# Patient Record
Sex: MALE | Race: BLACK OR AFRICAN AMERICAN | URBAN - METROPOLITAN AREA
[De-identification: names, ages, dates, MRNs, and addresses within clinical notes are randomized per-mention and may not be internally consistent; named-entity substitution may affect disease eponyms.]

---

## 2020-01-15 ENCOUNTER — EMERGENCY (EMERGENCY)
Facility: HOSPITAL | Age: 40
LOS: 1 days | Discharge: HOME | End: 2020-01-15
Attending: EMERGENCY MEDICINE
Payer: COMMERCIAL

## 2020-01-15 VITALS
SYSTOLIC BLOOD PRESSURE: 105 MMHG | HEIGHT: 74 IN | DIASTOLIC BLOOD PRESSURE: 72 MMHG | RESPIRATION RATE: 16 BRPM | OXYGEN SATURATION: 96 % | TEMPERATURE: 98 F | WEIGHT: 300.05 LBS | HEART RATE: 91 BPM

## 2020-01-15 VITALS
SYSTOLIC BLOOD PRESSURE: 132 MMHG | DIASTOLIC BLOOD PRESSURE: 63 MMHG | HEART RATE: 84 BPM | OXYGEN SATURATION: 97 % | RESPIRATION RATE: 20 BRPM | TEMPERATURE: 98 F

## 2020-01-15 LAB
ALBUMIN SERPL ELPH-MCNC: 4.3 G/DL — SIGNIFICANT CHANGE UP (ref 3.3–5.2)
ALP SERPL-CCNC: 51 U/L — SIGNIFICANT CHANGE UP (ref 40–120)
ALT FLD-CCNC: 52 U/L — HIGH
ANION GAP SERPL CALC-SCNC: 13 MMOL/L — SIGNIFICANT CHANGE UP (ref 5–17)
AST SERPL-CCNC: 41 U/L — HIGH
BASOPHILS # BLD AUTO: 0.04 K/UL — SIGNIFICANT CHANGE UP (ref 0–0.2)
BASOPHILS NFR BLD AUTO: 0.9 % — SIGNIFICANT CHANGE UP (ref 0–2)
BILIRUB SERPL-MCNC: 0.3 MG/DL — LOW (ref 0.4–2)
BUN SERPL-MCNC: 15 MG/DL — SIGNIFICANT CHANGE UP (ref 8–20)
CALCIUM SERPL-MCNC: 9.3 MG/DL — SIGNIFICANT CHANGE UP (ref 8.6–10.2)
CHLORIDE SERPL-SCNC: 98 MMOL/L — SIGNIFICANT CHANGE UP (ref 98–107)
CO2 SERPL-SCNC: 28 MMOL/L — SIGNIFICANT CHANGE UP (ref 22–29)
CREAT SERPL-MCNC: 1.29 MG/DL — SIGNIFICANT CHANGE UP (ref 0.5–1.3)
EOSINOPHIL # BLD AUTO: 0 K/UL — SIGNIFICANT CHANGE UP (ref 0–0.5)
EOSINOPHIL NFR BLD AUTO: 0 % — SIGNIFICANT CHANGE UP (ref 0–6)
GIANT PLATELETS BLD QL SMEAR: PRESENT — SIGNIFICANT CHANGE UP
GLUCOSE SERPL-MCNC: 127 MG/DL — HIGH (ref 70–115)
HCT VFR BLD CALC: 44.5 % — SIGNIFICANT CHANGE UP (ref 39–50)
HGB BLD-MCNC: 14.9 G/DL — SIGNIFICANT CHANGE UP (ref 13–17)
LYMPHOCYTES # BLD AUTO: 0.84 K/UL — LOW (ref 1–3.3)
LYMPHOCYTES # BLD AUTO: 20.4 % — SIGNIFICANT CHANGE UP (ref 13–44)
MANUAL SMEAR VERIFICATION: SIGNIFICANT CHANGE UP
MCHC RBC-ENTMCNC: 27.9 PG — SIGNIFICANT CHANGE UP (ref 27–34)
MCHC RBC-ENTMCNC: 33.5 GM/DL — SIGNIFICANT CHANGE UP (ref 32–36)
MCV RBC AUTO: 83.2 FL — SIGNIFICANT CHANGE UP (ref 80–100)
MONOCYTES # BLD AUTO: 0.62 K/UL — SIGNIFICANT CHANGE UP (ref 0–0.9)
MONOCYTES NFR BLD AUTO: 15 % — HIGH (ref 2–14)
NEUTROPHILS # BLD AUTO: 2.6 K/UL — SIGNIFICANT CHANGE UP (ref 1.8–7.4)
NEUTROPHILS NFR BLD AUTO: 61.9 % — SIGNIFICANT CHANGE UP (ref 43–77)
NEUTS BAND # BLD: 0.9 % — SIGNIFICANT CHANGE UP (ref 0–8)
PLAT MORPH BLD: NORMAL — SIGNIFICANT CHANGE UP
PLATELET # BLD AUTO: 177 K/UL — SIGNIFICANT CHANGE UP (ref 150–400)
POTASSIUM SERPL-MCNC: 3.7 MMOL/L — SIGNIFICANT CHANGE UP (ref 3.5–5.3)
POTASSIUM SERPL-SCNC: 3.7 MMOL/L — SIGNIFICANT CHANGE UP (ref 3.5–5.3)
PROT SERPL-MCNC: 7.1 G/DL — SIGNIFICANT CHANGE UP (ref 6.6–8.7)
RBC # BLD: 5.35 M/UL — SIGNIFICANT CHANGE UP (ref 4.2–5.8)
RBC # FLD: 12.9 % — SIGNIFICANT CHANGE UP (ref 10.3–14.5)
RBC BLD AUTO: NORMAL — SIGNIFICANT CHANGE UP
SODIUM SERPL-SCNC: 139 MMOL/L — SIGNIFICANT CHANGE UP (ref 135–145)
VARIANT LYMPHS # BLD: 0.9 % — SIGNIFICANT CHANGE UP (ref 0–6)
WBC # BLD: 4.14 K/UL — SIGNIFICANT CHANGE UP (ref 3.8–10.5)
WBC # FLD AUTO: 4.14 K/UL — SIGNIFICANT CHANGE UP (ref 3.8–10.5)

## 2020-01-15 PROCEDURE — 96374 THER/PROPH/DIAG INJ IV PUSH: CPT

## 2020-01-15 PROCEDURE — 99284 EMERGENCY DEPT VISIT MOD MDM: CPT | Mod: 25

## 2020-01-15 PROCEDURE — 36415 COLL VENOUS BLD VENIPUNCTURE: CPT

## 2020-01-15 PROCEDURE — 71045 X-RAY EXAM CHEST 1 VIEW: CPT | Mod: 26

## 2020-01-15 PROCEDURE — 80053 COMPREHEN METABOLIC PANEL: CPT

## 2020-01-15 PROCEDURE — 85027 COMPLETE CBC AUTOMATED: CPT

## 2020-01-15 PROCEDURE — 99285 EMERGENCY DEPT VISIT HI MDM: CPT

## 2020-01-15 PROCEDURE — 93010 ELECTROCARDIOGRAM REPORT: CPT

## 2020-01-15 PROCEDURE — 96375 TX/PRO/DX INJ NEW DRUG ADDON: CPT

## 2020-01-15 PROCEDURE — 71045 X-RAY EXAM CHEST 1 VIEW: CPT

## 2020-01-15 PROCEDURE — 93005 ELECTROCARDIOGRAM TRACING: CPT

## 2020-01-15 PROCEDURE — 94640 AIRWAY INHALATION TREATMENT: CPT

## 2020-01-15 RX ORDER — IPRATROPIUM/ALBUTEROL SULFATE 18-103MCG
3 AEROSOL WITH ADAPTER (GRAM) INHALATION ONCE
Refills: 0 | Status: COMPLETED | OUTPATIENT
Start: 2020-01-15 | End: 2020-01-15

## 2020-01-15 RX ORDER — KETOROLAC TROMETHAMINE 30 MG/ML
15 SYRINGE (ML) INJECTION ONCE
Refills: 0 | Status: DISCONTINUED | OUTPATIENT
Start: 2020-01-15 | End: 2020-01-15

## 2020-01-15 RX ORDER — SODIUM CHLORIDE 9 MG/ML
1000 INJECTION INTRAMUSCULAR; INTRAVENOUS; SUBCUTANEOUS ONCE
Refills: 0 | Status: COMPLETED | OUTPATIENT
Start: 2020-01-15 | End: 2020-01-15

## 2020-01-15 RX ORDER — ONDANSETRON 8 MG/1
4 TABLET, FILM COATED ORAL ONCE
Refills: 0 | Status: COMPLETED | OUTPATIENT
Start: 2020-01-15 | End: 2020-01-15

## 2020-01-15 RX ADMIN — Medication 3 MILLILITER(S): at 22:26

## 2020-01-15 RX ADMIN — ONDANSETRON 4 MILLIGRAM(S): 8 TABLET, FILM COATED ORAL at 22:24

## 2020-01-15 RX ADMIN — Medication 15 MILLIGRAM(S): at 22:25

## 2020-01-15 RX ADMIN — SODIUM CHLORIDE 1000 MILLILITER(S): 9 INJECTION INTRAMUSCULAR; INTRAVENOUS; SUBCUTANEOUS at 22:24

## 2020-01-15 NOTE — ED PROVIDER NOTE - PHYSICAL EXAMINATION
Gen: awake, non toxic, weak appearing  HEENT: slightly dry mucus membranes, pink conjunctivae, EOMI. no photophobia  Neck: supple. full rom  CV: RRR, nl s1/s2.  Resp: CTAB, normal rate and effort  GI: Abdomen soft, NT, ND. No rebound, no guarding  : No CVAT  Neuro: A&O x 3, moving all 4 extremities  MSK: No spine or joint tenderness to palpation  Skin: No rashes. intact and perfused.

## 2020-01-15 NOTE — ED PROVIDER NOTE - CLINICAL SUMMARY MEDICAL DECISION MAKING FREE TEXT BOX
pt with likely flu/other viral infection given constellation of symptoms. no meningeal symptoms, normal mental status currently, abd benign, cxr clear. felt improved with symptomatic treatment. has script for supportive care meds and tamiflu. several days of symptoms, return precautions stable for d/c.

## 2020-01-15 NOTE — ED PROVIDER NOTE - OBJECTIVE STATEMENT
38 y/o male no pmh c/o 2-3 days of myalgias, cough, wheezing, nausea with one episode vomiting, abd cramping, epistaxis and generalized weakness/fatigue.  WEnt to urgent care, had neg flu swab, given script for tamiflu and supportive care meds. Did not take any antipyretics today. No photophobia, neck pain/stiffness, significant headache. 38 y/o male no pmh c/o 2-3 days of myalgias, cough, wheezing, nausea with one episode vomiting, abd cramping, epistaxis and generalized weakness/fatigue.  WEnt to urgent care, had neg flu swab, given script for tamiflu and supportive care meds. Did not take any antipyretics today. No photophobia, neck pain/stiffness, significant headache.    ROS: + fever/chills. No eye pain/changes in vision, No ear pain/sore throat/dysphagia, No chest pain/palpitations. No SOB , no black/bloody bm. No dysuria/frequency/discharge, No headache. No Dizziness.    No rashes or breaks in skin. No numbness/tingling/weakness.

## 2020-01-15 NOTE — ED ADULT NURSE NOTE - OBJECTIVE STATEMENT
patient c/o nearly a week  of generalized body aches, weakness, cough, fever. states he went to urgent care and was flu negative but placed on tamiflu and symptoms persisted. per family he has not been eating/drinking as much as usual the last few days,

## 2020-01-15 NOTE — ED PROVIDER NOTE - PATIENT PORTAL LINK FT
You can access the FollowMyHealth Patient Portal offered by [facility] by registering at the following website: [email or web address]. By joining [employer]’s FollowMyHealth portal, you will also be able to view your health information using other applications (apps) compatible with our system.

## 2020-01-15 NOTE — ED PROVIDER NOTE - NSFOLLOWUPINSTRUCTIONS_ED_ALL_ED_FT
Viral Respiratory Infection    A viral respiratory infection is an illness that affects parts of the body used for breathing, like the lungs, nose, and throat. It is caused by a germ called a virus. Symptoms can include runny nose, coughing, sneezing, fatigue, body aches, sore throat, fever, or headache. Over the counter medicine can be used to manage the symptoms but the infection typically goes away on its own in 5 to 10 days.     SEEK IMMEDIATE MEDICAL CARE IF YOU HAVE ANY OF THE FOLLOWING SYMPTOMS: shortness of breath, chest pain, fever over 10 days, or lightheadedness/dizziness.     Fever    A fever is an increase in the body's temperature above 100.4°F (38°C) or higher. In adults and children older than three months, a brief mild or moderate fever generally has no long-term effect, and it usually does not require treatment. Many times, fevers are the result of viral infections, which are self-resolving.  However, certain symptoms or diagnostic tests may suggest a bacterial infection that may respond to antibiotics. Take medications as directed by your health care provider.    SEEK IMMEDIATE MEDICAL CARE IF YOU OR YOUR CHILD HAVE ANY OF THE FOLLOWING SYMPTOMS : shortness of breath, seizure, rash/stiff neck/headache, severe abdominal pain, persistent vomiting, any signs of dehydration, or if your child has a fever for over five (5) days.

## 2021-04-19 NOTE — ED ADULT NURSE NOTE - CAS TRG GENERAL NORM CIRC DET
Willis catheter 16 Divehi inserted using sterile technique.  Pt noted to have 200 mL urine output at this time. Strong peripheral pulses

## 2022-07-01 ENCOUNTER — OFFICE (OUTPATIENT)
Dept: URBAN - METROPOLITAN AREA CLINIC 115 | Facility: CLINIC | Age: 42
Setting detail: OPHTHALMOLOGY
End: 2022-07-01
Payer: COMMERCIAL

## 2022-07-01 DIAGNOSIS — B30.9: ICD-10-CM

## 2022-07-01 PROCEDURE — 92002 INTRM OPH EXAM NEW PATIENT: CPT | Performed by: OPTOMETRIST

## 2022-07-01 ASSESSMENT — TONOMETRY: OD_IOP_MMHG: 20

## 2022-07-01 ASSESSMENT — SPHEQUIV_DERIVED
OD_SPHEQUIV: -0.625
OS_SPHEQUIV: -0.125

## 2022-07-01 ASSESSMENT — REFRACTION_AUTOREFRACTION
OD_SPHERE: -0.75
OS_AXIS: 091
OD_CYLINDER: +0.25
OS_CYLINDER: -1.25
OS_SPHERE: +0.50
OD_AXIS: 138

## 2022-07-01 ASSESSMENT — VISUAL ACUITY
OD_BCVA: 20/20-1
OS_BCVA: 20/20

## 2022-11-27 ENCOUNTER — INPATIENT (INPATIENT)
Facility: HOSPITAL | Age: 42
LOS: 1 days | Discharge: HOME | DRG: 313 | End: 2022-11-29
Attending: FAMILY MEDICINE | Admitting: STUDENT IN AN ORGANIZED HEALTH CARE EDUCATION/TRAINING PROGRAM
Payer: COMMERCIAL

## 2022-11-27 VITALS
SYSTOLIC BLOOD PRESSURE: 179 MMHG | TEMPERATURE: 98 F | DIASTOLIC BLOOD PRESSURE: 105 MMHG | HEART RATE: 114 BPM | RESPIRATION RATE: 20 BRPM | OXYGEN SATURATION: 100 %

## 2022-11-27 DIAGNOSIS — R07.9 CHEST PAIN, UNSPECIFIED: ICD-10-CM

## 2022-11-27 LAB
ALBUMIN SERPL ELPH-MCNC: 3.6 G/DL — SIGNIFICANT CHANGE UP (ref 3.3–5)
ALP SERPL-CCNC: 68 U/L — SIGNIFICANT CHANGE UP (ref 40–120)
ALT FLD-CCNC: 122 U/L — HIGH (ref 12–78)
ANION GAP SERPL CALC-SCNC: 3 MMOL/L — LOW (ref 5–17)
AST SERPL-CCNC: 71 U/L — HIGH (ref 15–37)
BASOPHILS # BLD AUTO: 0.01 K/UL — SIGNIFICANT CHANGE UP (ref 0–0.2)
BASOPHILS # BLD AUTO: 0.05 K/UL — SIGNIFICANT CHANGE UP (ref 0–0.2)
BASOPHILS NFR BLD AUTO: 0.1 % — SIGNIFICANT CHANGE UP (ref 0–2)
BASOPHILS NFR BLD AUTO: 0.8 % — SIGNIFICANT CHANGE UP (ref 0–2)
BILIRUB SERPL-MCNC: 0.2 MG/DL — SIGNIFICANT CHANGE UP (ref 0.2–1.2)
BUN SERPL-MCNC: 10 MG/DL — SIGNIFICANT CHANGE UP (ref 7–23)
CALCIUM SERPL-MCNC: 8.5 MG/DL — SIGNIFICANT CHANGE UP (ref 8.5–10.1)
CHLORIDE SERPL-SCNC: 108 MMOL/L — SIGNIFICANT CHANGE UP (ref 96–108)
CO2 SERPL-SCNC: 29 MMOL/L — SIGNIFICANT CHANGE UP (ref 22–31)
CREAT SERPL-MCNC: 1.07 MG/DL — SIGNIFICANT CHANGE UP (ref 0.5–1.3)
D DIMER BLD IA.RAPID-MCNC: 257 NG/ML DDU — HIGH
D DIMER BLD IA.RAPID-MCNC: 3391 NG/ML DDU — HIGH
EGFR: 89 ML/MIN/1.73M2 — SIGNIFICANT CHANGE UP
EOSINOPHIL # BLD AUTO: 0.15 K/UL — SIGNIFICANT CHANGE UP (ref 0–0.5)
EOSINOPHIL # BLD AUTO: 0.25 K/UL — SIGNIFICANT CHANGE UP (ref 0–0.5)
EOSINOPHIL NFR BLD AUTO: 2.2 % — SIGNIFICANT CHANGE UP (ref 0–6)
EOSINOPHIL NFR BLD AUTO: 4 % — SIGNIFICANT CHANGE UP (ref 0–6)
FLUAV AG NPH QL: SIGNIFICANT CHANGE UP
FLUBV AG NPH QL: SIGNIFICANT CHANGE UP
GLUCOSE SERPL-MCNC: 136 MG/DL — HIGH (ref 70–99)
HCT VFR BLD CALC: 41.4 % — SIGNIFICANT CHANGE UP (ref 39–50)
HCT VFR BLD CALC: 41.6 % — SIGNIFICANT CHANGE UP (ref 39–50)
HGB BLD-MCNC: 12.2 G/DL — LOW (ref 13–17)
HGB BLD-MCNC: 14 G/DL — SIGNIFICANT CHANGE UP (ref 13–17)
IMM GRANULOCYTES NFR BLD AUTO: 0.2 % — SIGNIFICANT CHANGE UP (ref 0–0.9)
IMM GRANULOCYTES NFR BLD AUTO: 0.6 % — SIGNIFICANT CHANGE UP (ref 0–0.9)
LIDOCAIN IGE QN: 115 U/L — SIGNIFICANT CHANGE UP (ref 73–393)
LYMPHOCYTES # BLD AUTO: 1.02 K/UL — SIGNIFICANT CHANGE UP (ref 1–3.3)
LYMPHOCYTES # BLD AUTO: 16.5 % — SIGNIFICANT CHANGE UP (ref 13–44)
LYMPHOCYTES # BLD AUTO: 3.06 K/UL — SIGNIFICANT CHANGE UP (ref 1–3.3)
LYMPHOCYTES # BLD AUTO: 43.9 % — SIGNIFICANT CHANGE UP (ref 13–44)
MAGNESIUM SERPL-MCNC: 1.8 MG/DL — SIGNIFICANT CHANGE UP (ref 1.6–2.6)
MCHC RBC-ENTMCNC: 27.2 PG — SIGNIFICANT CHANGE UP (ref 27–34)
MCHC RBC-ENTMCNC: 28.2 PG — SIGNIFICANT CHANGE UP (ref 27–34)
MCHC RBC-ENTMCNC: 29.3 GM/DL — LOW (ref 32–36)
MCHC RBC-ENTMCNC: 33.8 GM/DL — SIGNIFICANT CHANGE UP (ref 32–36)
MCV RBC AUTO: 83.3 FL — SIGNIFICANT CHANGE UP (ref 80–100)
MCV RBC AUTO: 92.7 FL — SIGNIFICANT CHANGE UP (ref 80–100)
MONOCYTES # BLD AUTO: 0.61 K/UL — SIGNIFICANT CHANGE UP (ref 0–0.9)
MONOCYTES # BLD AUTO: 0.61 K/UL — SIGNIFICANT CHANGE UP (ref 0–0.9)
MONOCYTES NFR BLD AUTO: 8.8 % — SIGNIFICANT CHANGE UP (ref 2–14)
MONOCYTES NFR BLD AUTO: 9.8 % — SIGNIFICANT CHANGE UP (ref 2–14)
NEUTROPHILS # BLD AUTO: 3.1 K/UL — SIGNIFICANT CHANGE UP (ref 1.8–7.4)
NEUTROPHILS # BLD AUTO: 4.26 K/UL — SIGNIFICANT CHANGE UP (ref 1.8–7.4)
NEUTROPHILS NFR BLD AUTO: 44.4 % — SIGNIFICANT CHANGE UP (ref 43–77)
NEUTROPHILS NFR BLD AUTO: 68.7 % — SIGNIFICANT CHANGE UP (ref 43–77)
NT-PROBNP SERPL-SCNC: 98 PG/ML — SIGNIFICANT CHANGE UP (ref 0–125)
PLATELET # BLD AUTO: 208 K/UL — SIGNIFICANT CHANGE UP (ref 150–400)
PLATELET # BLD AUTO: SIGNIFICANT CHANGE UP (ref 150–400)
POTASSIUM SERPL-MCNC: 3.8 MMOL/L — SIGNIFICANT CHANGE UP (ref 3.5–5.3)
POTASSIUM SERPL-SCNC: 3.8 MMOL/L — SIGNIFICANT CHANGE UP (ref 3.5–5.3)
PROT SERPL-MCNC: 6.9 GM/DL — SIGNIFICANT CHANGE UP (ref 6–8.3)
RBC # BLD: 4.49 M/UL — SIGNIFICANT CHANGE UP (ref 4.2–5.8)
RBC # BLD: 4.97 M/UL — SIGNIFICANT CHANGE UP (ref 4.2–5.8)
RBC # FLD: 13.3 % — SIGNIFICANT CHANGE UP (ref 10.3–14.5)
RBC # FLD: 16.7 % — HIGH (ref 10.3–14.5)
RSV RNA NPH QL NAA+NON-PROBE: SIGNIFICANT CHANGE UP
SARS-COV-2 RNA SPEC QL NAA+PROBE: SIGNIFICANT CHANGE UP
SODIUM SERPL-SCNC: 140 MMOL/L — SIGNIFICANT CHANGE UP (ref 135–145)
TROPONIN I, HIGH SENSITIVITY RESULT: 10.91 NG/L — SIGNIFICANT CHANGE UP
WBC # BLD: 6.2 K/UL — SIGNIFICANT CHANGE UP (ref 3.8–10.5)
WBC # BLD: 6.97 K/UL — SIGNIFICANT CHANGE UP (ref 3.8–10.5)
WBC # FLD AUTO: 6.2 K/UL — SIGNIFICANT CHANGE UP (ref 3.8–10.5)
WBC # FLD AUTO: 6.97 K/UL — SIGNIFICANT CHANGE UP (ref 3.8–10.5)

## 2022-11-27 PROCEDURE — 76700 US EXAM ABDOM COMPLETE: CPT

## 2022-11-27 PROCEDURE — 85025 COMPLETE CBC W/AUTO DIFF WBC: CPT

## 2022-11-27 PROCEDURE — 93017 CV STRESS TEST TRACING ONLY: CPT

## 2022-11-27 PROCEDURE — 83036 HEMOGLOBIN GLYCOSYLATED A1C: CPT

## 2022-11-27 PROCEDURE — 80053 COMPREHEN METABOLIC PANEL: CPT

## 2022-11-27 PROCEDURE — 82962 GLUCOSE BLOOD TEST: CPT

## 2022-11-27 PROCEDURE — 80061 LIPID PANEL: CPT

## 2022-11-27 PROCEDURE — 93010 ELECTROCARDIOGRAM REPORT: CPT

## 2022-11-27 PROCEDURE — 78452 HT MUSCLE IMAGE SPECT MULT: CPT

## 2022-11-27 PROCEDURE — 93306 TTE W/DOPPLER COMPLETE: CPT

## 2022-11-27 PROCEDURE — 71045 X-RAY EXAM CHEST 1 VIEW: CPT | Mod: 26

## 2022-11-27 PROCEDURE — 71275 CT ANGIOGRAPHY CHEST: CPT | Mod: 26,MA

## 2022-11-27 PROCEDURE — 85027 COMPLETE CBC AUTOMATED: CPT

## 2022-11-27 PROCEDURE — 84484 ASSAY OF TROPONIN QUANT: CPT

## 2022-11-27 PROCEDURE — 36415 COLL VENOUS BLD VENIPUNCTURE: CPT

## 2022-11-27 PROCEDURE — 99285 EMERGENCY DEPT VISIT HI MDM: CPT

## 2022-11-27 PROCEDURE — A9500: CPT

## 2022-11-27 PROCEDURE — 80074 ACUTE HEPATITIS PANEL: CPT

## 2022-11-27 PROCEDURE — 93005 ELECTROCARDIOGRAM TRACING: CPT

## 2022-11-27 PROCEDURE — G0378: CPT

## 2022-11-27 RX ORDER — ASPIRIN/CALCIUM CARB/MAGNESIUM 324 MG
325 TABLET ORAL ONCE
Refills: 0 | Status: COMPLETED | OUTPATIENT
Start: 2022-11-27 | End: 2022-11-27

## 2022-11-27 RX ORDER — NITROGLYCERIN 6.5 MG
0.4 CAPSULE, EXTENDED RELEASE ORAL
Refills: 0 | Status: DISCONTINUED | OUTPATIENT
Start: 2022-11-27 | End: 2022-11-29

## 2022-11-27 RX ORDER — MORPHINE SULFATE 50 MG/1
4 CAPSULE, EXTENDED RELEASE ORAL ONCE
Refills: 0 | Status: DISCONTINUED | OUTPATIENT
Start: 2022-11-27 | End: 2022-11-27

## 2022-11-27 RX ADMIN — Medication 0.4 MILLIGRAM(S): at 20:07

## 2022-11-27 RX ADMIN — Medication 0.4 MILLIGRAM(S): at 20:21

## 2022-11-27 RX ADMIN — Medication 325 MILLIGRAM(S): at 20:06

## 2022-11-27 NOTE — ED ADULT NURSE NOTE - OBJECTIVE STATEMENT
patient complaining of sternal chest pain with sob which has been going for the past 2 weeks, worsened today. patient reports that talking aggravates  his pain, denies nausea, vomiting, fever

## 2022-11-27 NOTE — ED ADULT NURSE REASSESSMENT NOTE - NS ED NURSE REASSESS COMMENT FT1
PA at bedside
patient reports chest pressure improvement after second nitroglycerin, patient rates pain at 3/10 and declines any more nitro.

## 2022-11-27 NOTE — ED PROVIDER NOTE - PHYSICAL EXAMINATION
Constitutional: NAD AAOx3  Eyes: PERRL, EOMI  Head: Normocephalic atraumatic  Mouth: MMM  Cardiac: tachycardic   Resp: Lungs CTAB  GI: Abd s/nt/nd  Neuro: CN2-12 intact  Extremities: Intact distal pulses b/l, no calf tenderness, normal ROM b/l UE and LE   Skin: No rashes

## 2022-11-27 NOTE — ED PROVIDER NOTE - OBJECTIVE STATEMENT
43 y/o male presents to the ED for intermittent chest pressure x 2weeks. Tonight, pt was headed to [place] when chest pressure got worse associated with blurry vision, sweating, and tingling/cramping in extremities. Pt went to a gas station nearby and called 911. Pt states that his symptoms began to alleviate when EMS arrived. Pt given O2 on route to ED, no other medication. Denying any recent airplane travel, car rides, or recent surgery. Denies leg swelling or pain. Went to [place] a couple years ago for a chest workup which did not show reveal anything acute.

## 2022-11-27 NOTE — ED PROVIDER NOTE - NSICDXPASTMEDICALHX_GEN_ALL_CORE_FT
PAST MEDICAL HISTORY:  Chest Mass Removal of "mass". otherwise unknown    Chest Mass     HTN - Hypertension     No pertinent past medical history

## 2022-11-27 NOTE — ED ADULT NURSE NOTE - CHIEF COMPLAINT QUOTE
patient brought in by EMS c/o chest pain.  patient reports mild mid sternal chest pain x 1 week.  sent at urgent care a few weeks ago.  was on way to [facility] today due to pain not subsiding and had acute onset of palpitations and numbness to bilateral fingers.  feels better now but still has chest pain.  ekg performed. What Is The Reason For Today's Visit?: Full Body Skin Examination What Is The Reason For Today's Visit? (Being Monitored For X): the development of new lesions

## 2022-11-27 NOTE — ED PROVIDER NOTE - CLINICAL SUMMARY MEDICAL DECISION MAKING FREE TEXT BOX
Cardiac workup, D-dimer, CTA chest, reeval. EKG is sinus tachycardic with pvc's,  D-dimer, CTA chest, reeval, dimer was positive, cta is negative for PE, pain had improved with ntg but returned while here, d/w medicine for admission.

## 2022-11-27 NOTE — ED ADULT NURSE NOTE - IN THE PAST 12 MONTHS HAVE YOU USED DRUGS OTHER THAN THOSE REQUIRED FOR MEDICAL REASON?
Strep Throat  Strep throat is a bacterial infection of the throat. Your health care provider may call the infection tonsillitis or pharyngitis, depending on whether there is swelling in the tonsils or at the back of the throat. Strep throat is most common during the cold months of the year in children who are 5-15 years of age, but it can happen during any season in people of any age. This infection is spread from person to person (contagious) through coughing, sneezing, or close contact.  What are the causes?  Strep throat is caused by the bacteria called Streptococcus pyogenes.  What increases the risk?  This condition is more likely to develop in:  · People who spend time in crowded places where the infection can spread easily.  · People who have close contact with someone who has strep throat.    What are the signs or symptoms?  Symptoms of this condition include:  · Fever or chills.  · Redness, swelling, or pain in the tonsils or throat.  · Pain or difficulty when swallowing.  · White or yellow spots on the tonsils or throat.  · Swollen, tender glands in the neck or under the jaw.  · Red rash all over the body (rare).    How is this diagnosed?  This condition is diagnosed by performing a rapid strep test or by taking a swab of your throat (throat culture test). Results from a rapid strep test are usually ready in a few minutes, but throat culture test results are available after one or two days.  How is this treated?  This condition is treated with antibiotic medicine.  Follow these instructions at home:  Medicines  · Take over-the-counter and prescription medicines only as told by your health care provider.  · Take your antibiotic as told by your health care provider. Do not stop taking the antibiotic even if you start to feel better.  · Have family members who also have a sore throat or fever tested for strep throat. They may need antibiotics if they have the strep infection.  Eating and drinking  · Do not  share food, drinking cups, or personal items that could cause the infection to spread to other people.  · If swallowing is difficult, try eating soft foods until your sore throat feels better.  · Drink enough fluid to keep your urine clear or pale yellow.  General instructions  · Gargle with a salt-water mixture 3-4 times per day or as needed. To make a salt-water mixture, completely dissolve ½-1 tsp of salt in 1 cup of warm water.  · Make sure that all household members wash their hands well.  · Get plenty of rest.  · Stay home from school or work until you have been taking antibiotics for 24 hours.  · Keep all follow-up visits as told by your health care provider. This is important.  Contact a health care provider if:  · The glands in your neck continue to get bigger.  · You develop a rash, cough, or earache.  · You cough up a thick liquid that is green, yellow-brown, or bloody.  · You have pain or discomfort that does not get better with medicine.  · Your problems seem to be getting worse rather than better.  · You have a fever.  Get help right away if:  · You have new symptoms, such as vomiting, severe headache, stiff or painful neck, chest pain, or shortness of breath.  · You have severe throat pain, drooling, or changes in your voice.  · You have swelling of the neck, or the skin on the neck becomes red and tender.  · You have signs of dehydration, such as fatigue, dry mouth, and decreased urination.  · You become increasingly sleepy, or you cannot wake up completely.  · Your joints become red or painful.  This information is not intended to replace advice given to you by your health care provider. Make sure you discuss any questions you have with your health care provider.  Document Released: 12/15/2001 Document Revised: 08/16/2017 Document Reviewed: 04/11/2016  [employer] Interactive Patient Education © 2018 [employer]     No

## 2022-11-27 NOTE — ED PROVIDER NOTE - NS ED ROS FT
Constitutional: No fever or chills  Eyes: (+) blurry vision  HEENT: No throat pain  CV: (+) chest pressure  Resp: No SOB no cough  GI: No abd pain, nausea or vomiting  : No dysuria  MSK: No musculoskeletal pain  Skin: No rash  Neuro: No headache (+) tingling/cramping of extremities

## 2022-11-27 NOTE — ED ADULT TRIAGE NOTE - CHIEF COMPLAINT QUOTE
patient brought in by EMS c/o chest pain.  patient reports mild mid sternal chest pain x 1 week.  sent at urgent care a few weeks ago.  was on way to [facility] today due to pain not subsiding and had acute onset of palpitations and numbness to bilateral fingers.  feels better now but still has chest pain.  ekg performed.

## 2022-11-28 DIAGNOSIS — R07.89 OTHER CHEST PAIN: ICD-10-CM

## 2022-11-28 LAB
A1C WITH ESTIMATED AVERAGE GLUCOSE RESULT: 6.7 % — HIGH (ref 4–5.6)
ALBUMIN SERPL ELPH-MCNC: 3.6 G/DL — SIGNIFICANT CHANGE UP (ref 3.3–5)
ALP SERPL-CCNC: 58 U/L — SIGNIFICANT CHANGE UP (ref 40–120)
ALT FLD-CCNC: 108 U/L — HIGH (ref 12–78)
ANION GAP SERPL CALC-SCNC: 6 MMOL/L — SIGNIFICANT CHANGE UP (ref 5–17)
AST SERPL-CCNC: 53 U/L — HIGH (ref 15–37)
BASOPHILS # BLD AUTO: 0.03 K/UL — SIGNIFICANT CHANGE UP (ref 0–0.2)
BASOPHILS NFR BLD AUTO: 0.5 % — SIGNIFICANT CHANGE UP (ref 0–2)
BILIRUB SERPL-MCNC: 0.6 MG/DL — SIGNIFICANT CHANGE UP (ref 0.2–1.2)
BUN SERPL-MCNC: 9 MG/DL — SIGNIFICANT CHANGE UP (ref 7–23)
CALCIUM SERPL-MCNC: 8.6 MG/DL — SIGNIFICANT CHANGE UP (ref 8.5–10.1)
CHLORIDE SERPL-SCNC: 106 MMOL/L — SIGNIFICANT CHANGE UP (ref 96–108)
CHOLEST SERPL-MCNC: 227 MG/DL — HIGH
CO2 SERPL-SCNC: 27 MMOL/L — SIGNIFICANT CHANGE UP (ref 22–31)
CREAT SERPL-MCNC: 0.84 MG/DL — SIGNIFICANT CHANGE UP (ref 0.5–1.3)
EGFR: 112 ML/MIN/1.73M2 — SIGNIFICANT CHANGE UP
EOSINOPHIL # BLD AUTO: 0.27 K/UL — SIGNIFICANT CHANGE UP (ref 0–0.5)
EOSINOPHIL NFR BLD AUTO: 4.1 % — SIGNIFICANT CHANGE UP (ref 0–6)
ESTIMATED AVERAGE GLUCOSE: 146 MG/DL — HIGH (ref 68–114)
GLUCOSE SERPL-MCNC: 140 MG/DL — HIGH (ref 70–99)
HAV IGM SER-ACNC: SIGNIFICANT CHANGE UP
HBV CORE IGM SER-ACNC: SIGNIFICANT CHANGE UP
HBV SURFACE AG SER-ACNC: SIGNIFICANT CHANGE UP
HCT VFR BLD CALC: 40.8 % — SIGNIFICANT CHANGE UP (ref 39–50)
HCV AB S/CO SERPL IA: 0.05 S/CO — SIGNIFICANT CHANGE UP (ref 0–0.99)
HCV AB SERPL-IMP: SIGNIFICANT CHANGE UP
HDLC SERPL-MCNC: 46 MG/DL — SIGNIFICANT CHANGE UP
HGB BLD-MCNC: 13.7 G/DL — SIGNIFICANT CHANGE UP (ref 13–17)
IMM GRANULOCYTES NFR BLD AUTO: 0.2 % — SIGNIFICANT CHANGE UP (ref 0–0.9)
LIPID PNL WITH DIRECT LDL SERPL: 166 MG/DL — HIGH
LYMPHOCYTES # BLD AUTO: 1.92 K/UL — SIGNIFICANT CHANGE UP (ref 1–3.3)
LYMPHOCYTES # BLD AUTO: 29.4 % — SIGNIFICANT CHANGE UP (ref 13–44)
MCHC RBC-ENTMCNC: 27.8 PG — SIGNIFICANT CHANGE UP (ref 27–34)
MCHC RBC-ENTMCNC: 33.6 GM/DL — SIGNIFICANT CHANGE UP (ref 32–36)
MCV RBC AUTO: 82.8 FL — SIGNIFICANT CHANGE UP (ref 80–100)
MONOCYTES # BLD AUTO: 0.62 K/UL — SIGNIFICANT CHANGE UP (ref 0–0.9)
MONOCYTES NFR BLD AUTO: 9.5 % — SIGNIFICANT CHANGE UP (ref 2–14)
NEUTROPHILS # BLD AUTO: 3.68 K/UL — SIGNIFICANT CHANGE UP (ref 1.8–7.4)
NEUTROPHILS NFR BLD AUTO: 56.3 % — SIGNIFICANT CHANGE UP (ref 43–77)
NON HDL CHOLESTEROL: 181 MG/DL — HIGH
PLATELET # BLD AUTO: 195 K/UL — SIGNIFICANT CHANGE UP (ref 150–400)
POTASSIUM SERPL-MCNC: 3.7 MMOL/L — SIGNIFICANT CHANGE UP (ref 3.5–5.3)
POTASSIUM SERPL-SCNC: 3.7 MMOL/L — SIGNIFICANT CHANGE UP (ref 3.5–5.3)
PROT SERPL-MCNC: 6.6 GM/DL — SIGNIFICANT CHANGE UP (ref 6–8.3)
RBC # BLD: 4.93 M/UL — SIGNIFICANT CHANGE UP (ref 4.2–5.8)
RBC # FLD: 13.5 % — SIGNIFICANT CHANGE UP (ref 10.3–14.5)
SODIUM SERPL-SCNC: 139 MMOL/L — SIGNIFICANT CHANGE UP (ref 135–145)
TRIGL SERPL-MCNC: 74 MG/DL — SIGNIFICANT CHANGE UP
TROPONIN I, HIGH SENSITIVITY RESULT: 10.52 NG/L — SIGNIFICANT CHANGE UP
TROPONIN I, HIGH SENSITIVITY RESULT: 11.54 NG/L — SIGNIFICANT CHANGE UP
TROPONIN I, HIGH SENSITIVITY RESULT: 8.76 NG/L — SIGNIFICANT CHANGE UP
WBC # BLD: 6.53 K/UL — SIGNIFICANT CHANGE UP (ref 3.8–10.5)
WBC # FLD AUTO: 6.53 K/UL — SIGNIFICANT CHANGE UP (ref 3.8–10.5)

## 2022-11-28 PROCEDURE — 99223 1ST HOSP IP/OBS HIGH 75: CPT | Mod: GC

## 2022-11-28 PROCEDURE — 99223 1ST HOSP IP/OBS HIGH 75: CPT

## 2022-11-28 PROCEDURE — 12345: CPT | Mod: NC,GC

## 2022-11-28 PROCEDURE — 93010 ELECTROCARDIOGRAM REPORT: CPT

## 2022-11-28 PROCEDURE — 93306 TTE W/DOPPLER COMPLETE: CPT | Mod: 26

## 2022-11-28 RX ORDER — SOD,AMMONIUM,POTASSIUM LACTATE
1 CREAM (GRAM) TOPICAL
Qty: 0 | Refills: 0 | DISCHARGE

## 2022-11-28 RX ORDER — DEXTROSE 50 % IN WATER 50 %
25 SYRINGE (ML) INTRAVENOUS ONCE
Refills: 0 | Status: DISCONTINUED | OUTPATIENT
Start: 2022-11-28 | End: 2022-11-29

## 2022-11-28 RX ORDER — HEPARIN SODIUM 5000 [USP'U]/ML
5000 INJECTION INTRAVENOUS; SUBCUTANEOUS EVERY 12 HOURS
Refills: 0 | Status: DISCONTINUED | OUTPATIENT
Start: 2022-11-28 | End: 2022-11-29

## 2022-11-28 RX ORDER — ASPIRIN/CALCIUM CARB/MAGNESIUM 324 MG
81 TABLET ORAL DAILY
Refills: 0 | Status: DISCONTINUED | OUTPATIENT
Start: 2022-11-28 | End: 2022-11-29

## 2022-11-28 RX ORDER — HYDRALAZINE HCL 50 MG
10 TABLET ORAL EVERY 6 HOURS
Refills: 0 | Status: DISCONTINUED | OUTPATIENT
Start: 2022-11-28 | End: 2022-11-29

## 2022-11-28 RX ORDER — GLUCAGON INJECTION, SOLUTION 0.5 MG/.1ML
1 INJECTION, SOLUTION SUBCUTANEOUS ONCE
Refills: 0 | Status: DISCONTINUED | OUTPATIENT
Start: 2022-11-28 | End: 2022-11-29

## 2022-11-28 RX ORDER — CLINDAMYCIN PHOSPHATE GEL USP, 1% 10 MG/G
1 GEL TOPICAL
Qty: 0 | Refills: 0 | DISCHARGE

## 2022-11-28 RX ORDER — ONDANSETRON 8 MG/1
4 TABLET, FILM COATED ORAL EVERY 8 HOURS
Refills: 0 | Status: DISCONTINUED | OUTPATIENT
Start: 2022-11-28 | End: 2022-11-29

## 2022-11-28 RX ORDER — SODIUM CHLORIDE 9 MG/ML
1000 INJECTION, SOLUTION INTRAVENOUS
Refills: 0 | Status: DISCONTINUED | OUTPATIENT
Start: 2022-11-28 | End: 2022-11-29

## 2022-11-28 RX ORDER — ATORVASTATIN CALCIUM 80 MG/1
40 TABLET, FILM COATED ORAL AT BEDTIME
Refills: 0 | Status: DISCONTINUED | OUTPATIENT
Start: 2022-11-28 | End: 2022-11-28

## 2022-11-28 RX ORDER — DEXTROSE 50 % IN WATER 50 %
15 SYRINGE (ML) INTRAVENOUS ONCE
Refills: 0 | Status: DISCONTINUED | OUTPATIENT
Start: 2022-11-28 | End: 2022-11-29

## 2022-11-28 RX ORDER — DEXTROSE 50 % IN WATER 50 %
12.5 SYRINGE (ML) INTRAVENOUS ONCE
Refills: 0 | Status: DISCONTINUED | OUTPATIENT
Start: 2022-11-28 | End: 2022-11-29

## 2022-11-28 RX ORDER — INSULIN LISPRO 100/ML
VIAL (ML) SUBCUTANEOUS AT BEDTIME
Refills: 0 | Status: DISCONTINUED | OUTPATIENT
Start: 2022-11-28 | End: 2022-11-29

## 2022-11-28 RX ORDER — INSULIN LISPRO 100/ML
VIAL (ML) SUBCUTANEOUS
Refills: 0 | Status: DISCONTINUED | OUTPATIENT
Start: 2022-11-28 | End: 2022-11-29

## 2022-11-28 RX ADMIN — Medication 81 MILLIGRAM(S): at 09:25

## 2022-11-28 RX ADMIN — HEPARIN SODIUM 5000 UNIT(S): 5000 INJECTION INTRAVENOUS; SUBCUTANEOUS at 13:23

## 2022-11-28 NOTE — H&P ADULT - NSICDXFAMILYHX_GEN_ALL_CORE_FT
FAMILY HISTORY:  Father  Still living? Unknown  FH: HTN (hypertension), Age at diagnosis: Age Unknown    Sibling  Still living? Unknown  FHx: hypertension, Age at diagnosis: Age Unknown

## 2022-11-28 NOTE — CONSULT NOTE ADULT - PROBLEM SELECTOR RECOMMENDATION 9
-Chest pain w/ atypical but w/ some typical features (some relief w/ NTG  -ECG normal. Trop neg x 3.  -Discussed w/ pt & daughter risk stratification.  They prefer stress testing & if neg FU w/ OP cardio at. [facility].  -Chem stress ordered.

## 2022-11-28 NOTE — H&P ADULT - ASSESSMENT
41 y/o M PMH prediabetes presents with complaint of chest pain    #Chest pain r/o ACS  -admit to tele  -given  x1   -start ASA 81 mg QD  -EKG sinus arrythmia with PVCs, no ST changes   -D-dimer positive 257  -CT angio chest negative for PE  -troponin negative x1, f/u repeat trop  -order TTE   -f/u fasting lipid panel and HbA1C to assess ASCVD risk  -cardio consult    #Transaminitis  -f/u hepatitis panel  -f/u CMP AM  -if persistent order RUQ us    #Hyperglycemia  -glucose 136  -f/u A1c    #DVT prophylaxis  -SCDs    Case discussed with Dr. Olson 41 y/o M PMH prediabetes presents with complaint of chest pain    #Chest pain r/o ACS  -admit to tele  -given  x1   -start ASA 81 mg QD  -EKG sinus arrythmia with PVCs, no ST changes   -D-dimer positive 257  -CT angio chest negative for PE  -troponin negative x1, f/u repeat trop  -will order TTE   -f/u fasting lipid panel and HbA1C to assess ASCVD risk  -cardio consult    #Transaminitis  -f/u hepatitis panel  -f/u CMP AM  -if persistent order RUQ us    #Hyperglycemia  -glucose 136  -f/u A1c    #DVT prophylaxis  -SCDs    Case discussed with Dr. Olson 43 y/o M PMH prediabetes presents with complaint of chest pain    #Chest pain r/o ACS  -admit to tele  -given  x1   -start ASA 81 mg QD  -EKG sinus arrythmia with PVCs, no ST changes   -D-dimer positive 257  -CT angio chest negative for PE  -troponin negative x1, f/u repeat trop  -will order TTE   -f/u fasting lipid panel and HbA1C to assess ASCVD risk  -cardio consult    #Transaminitis  -f/u hepatitis panel  -f/u CMP AM  -if persistent order RUQ us    #Hyperglycemia  -glucose 136  -f/u A1c    #DVT prophylaxis  -SCDs    IMPROVE VTE Individual Risk Assessment          RISK                                                          Points  [  ] Previous VTE                                                3  [  ] Thrombophilia                                             2  [  ] Lower limb paralysis                                   2        (unable to hold up >15 seconds)    [  ] Current Cancer                                             2         (within 6 months)  [  ] Immobilization > 24 hrs                              1  [  ] ICU/CCU stay > 24 hours                             1  [  ] Age > 60                                                         1    IMPROVE VTE Score:         [    0     ]    Total Risk Factor Score:    0 - 1:   Consider IPC  >2 - 3:  Thromboprophylaxis required (enoxaparin or SQ heparin)        >4:   High Risk: Thromboprophylaxis required (enoxaparin or SQ heparin), optional add IPC  **If CONTRAINDICATION to enoxaparin or SQ heparin, USE IPCs**        Case discussed with Dr. Olson 43 y/o M PMH prediabetes presents with complaint of chest pain    #Chest pain r/o ACS  -admit to tele  -given  x1   -start ASA 81 mg QD  -EKG sinus arrythmia with PVCs, no ST changes or T wave inversions  -D-dimer positive 257  -CT angio chest negative for PE  -troponin negative x2, f/u third troponin  -will order TTE   -f/u fasting lipid panel and HbA1C to assess ASCVD risk  -cardio consult    #Transaminitis  -AST 71,    -f/u hepatitis panel  -f/u CMP AM  -if persistent order RUQ us    #Hyperglycemia  -glucose 136  -f/u A1c    #DVT prophylaxis  -SCDs    IMPROVE VTE Individual Risk Assessment          RISK                                                          Points  [  ] Previous VTE                                                3  [  ] Thrombophilia                                             2  [  ] Lower limb paralysis                                   2        (unable to hold up >15 seconds)    [  ] Current Cancer                                             2         (within 6 months)  [  ] Immobilization > 24 hrs                              1  [  ] ICU/CCU stay > 24 hours                             1  [  ] Age > 60                                                         1    IMPROVE VTE Score:         [    0     ]    Total Risk Factor Score:    0 - 1:   Consider IPC  >2 - 3:  Thromboprophylaxis required (enoxaparin or SQ heparin)        >4:   High Risk: Thromboprophylaxis required (enoxaparin or SQ heparin), optional add IPC  **If CONTRAINDICATION to enoxaparin or SQ heparin, USE IPCs**        Case discussed with Dr. Olson 41 y/o M PMH prediabetes presents with complaint of chest pain    #Chest pain r/o ACS  -admit to tele  -given  x1   -start ASA 81 mg QD  -EKG sinus arrythmia with PVCs, no ST changes or T wave inversions  -D-dimer positive 257  -CT angio chest negative for PE  -troponin negative x2, f/u third troponin  -will order TTE   -f/u fasting lipid panel and HbA1C to assess ASCVD risk  -f/u cardio consult    #Elevated BP without history of HTN  -/105 on admission, improved to 133/97  -monitor BP  -will add PRN hydralazine for systolic BP >160  -consider adding antihypertensive agent if persistently elevated BP    #Transaminitis  -AST 71,    -f/u hepatitis panel  -f/u CMP AM  -CTA shows hepatic steatosis  -if persistently elevated LFTs, consider ordering RUQ US    #Prediabetes  -glucose 136  -f/u A1c    #DVT prophylaxis  -heparin subq    Case discussed with Dr. Olson

## 2022-11-28 NOTE — H&P ADULT - ATTENDING COMMENTS
41 y/o M presents with chest pressure.     General: Awake and alert, cooperative with exam. No acute distress.   Skin: Warm, dry, and pink.   Eyes: Pupils equal and reactive to light. Extraocular eye movements intact. No conjunctival injection, discharge, or scleral icterus.   HEENT: Atraumatic, normocephalic. Moist mucus membranes.  Cardiology: Normal S1, S2. No murmurs, rubs, or gallops. Regular rate and rhythm. No reproducible chest pain.   Respiratory: Lungs clear to ascultation bilaterally. Good air exchange. No wheezes, rales, or rhonchi. Normal chest expansion.   Gastrointestinal: Positive bowel sounds. Soft, non-tender, non-distended. No guarding, rigidity, or rebound tenderness. No hepatosplenomegaly.   Musculoskeletal: 5/5 motor strength in all extremities. Normal range of motion.   Extremities: No peripheral edema bilaterally. Dorsalis pedis pulses 2+ bilaterally.   Neurological: A+Ox3 (person, place, and time). Cranial nerves 2-12 intact. Normal speech. No facial droop. No focal neurological deficits. 5/5 motor strength in all extremities.   Psychiatric: Normal affect. Normal mood.     ER course: -114, //105. Labs: D-dimer 257, glucose 136, AST 71, . COVID, influenza A/B, RSV negative. EKG: NSR with HR 95 bpm, PVCs, LVH, no ST segment changes, no T wave inversions (personally reviewed).     Imaging:   - CXR: cardiomegaly, no consolidation, no effusion, no pneumothorax (personally reviewed).   - CTA: No pulmonary embolism. Punctate right lower lobe calcified granuloma, hepatic steatosis.     Pt was given 325 mg of aspirin and 2 doses of nitroglycerin. He is being admitted to telemetry for further management.     41 y/o M presented with chest pressure     1. Chest pressure with radiation to the arms likely secondary to stable angina vs. elevated BP/hypertensive urgency vs. NSTEMI vs. GERD vs. costochondritis   - Admit to telemetry   - EKG: no ST segment changes or T wave inversions   - Troponin 10.91 -> 11.54, will trend 1 more troponin   - s/p 325 mg of aspirin in the ER, c/w 81 mg of aspirin daily   - Ordered ECHO, lipid panel   - Zofran and Maalox for nausea, Nitroglycerin PRN for chest pain   - If troponins continue to trend upwards, will order heparin  - NPO after midnight   - Given description of chest pain (substernal, relief with nitroglycerin), pt likely needs stress test vs. cardiac cath   - Cardiology consult - Dr. Palla     2. Transaminitis  - AST 71, , will trend   - CTA: hepatic steatosis   - Will order hepatitis panel, HbA1c, lipid panel   - If persistent elevation in LFTs, will order RUQ US     3. Elevated BP without a history of HTN   - //105, monitor   - If persistent elevation > 130/90, will add anti-HTN agent  - Hydralazine for SBP > 160     4. Elevated D-dimer   - D-dimer 257, CTA was negative for PE     5. Hyperglycemia   - Glucose 136   - Ordered HbA1c     DVT ppx: Heparin 5,000 units Q12H (hold for PLT <50,000)

## 2022-11-28 NOTE — H&P ADULT - HISTORY OF PRESENT ILLNESS
41 y/o M PMH prediabetes presents with complaint of chest pain. Patient states has been having intermittent chest pressure for the past 2 weeks. States been having intermittent chest pressure x 2 weeks.  Initially patient thought due to heartburn and started nexium. However today patient states was at gas station and pain worsened and had associated symptoms of shortness of breath, blurry vision, palpitations, tingling in b/l extremities and diaphoresis. Describes pain in middle of chest, nonradiating, no aggravating or alleviating factors.  Due to worsening of symptoms pt called 911 and brought in by EMS. Denies any recent travel. Denies fever, chills, N/V, abdominal pain, leg swelling or pain, cough. Denies any recent stressful event in life. Patient states many years ago was having chest pain and went to [facility]. States had cardiac workup and followed up outpatient with cardiologist and was told everything was normal  41 y/o M PMH prediabetes presents with complaint of chest pain. Patient states has been having intermittent chest pressure for the past 2 weeks. Initially patient thought due to heartburn and started nexium. However today patient states was at gas station and pain worsened and had associated symptoms of shortness of breath, blurry vision, palpitations, tingling in b/l extremities and diaphoresis. Describes pain in middle of chest, nonradiating, no aggravating or alleviating factors.  Due to worsening of symptoms pt called 911 and brought in by EMS. Denies any recent travel. Denies fever, chills, N/V, abdominal pain, leg swelling or pain, cough. Denies any recent stressful event in life. Patient  many years ago was having chest pain and went to [facility]. States had cardiac workup and followed up outpatient with cardiologist and was told everything was normal     In the ED BP was 179/105, , T 98.3, RR 20. Patient was given morphine x1, sublingula nitroglycerin x2 41 y/o M PMH prediabetes presents with complaint of chest pain. Patient states has been having intermittent chest pressure for the past 2 weeks. Initially patient thought due to heartburn and started nexium. However today patient states was at gas station and pain worsened and had associated symptoms of shortness of breath, blurry vision, palpitations, tingling in b/l extremities and diaphoresis. Describes pain in middle of chest, nonradiating, no aggravating or alleviating factors.  Due to worsening of symptoms pt called 911 and brought in by EMS. Denies any recent travel. Denies fever, chills, N/V, abdominal pain, leg swelling or pain, cough. Denies any recent stressful event in life. Patient  many years ago was having chest pain and went to [facility]. States had cardiac workup and followed up outpatient with cardiologist and was told everything was normal     In the ED BP was 179/105, , T 98.3, RR 20. Patient was given morphine x1, sublingual nitroglycerin x2 41 y/o M PMH prediabetes presents with complaint of chest pain. Patient states has been having intermittent chest pressure for the past 2 weeks. Initially patient thought due to heartburn and started nexium. However today patient states was at gas station and pain worsened and had associated symptoms of shortness of breath, blurry vision, palpitations, tingling in b/l extremities and diaphoresis. Describes pain in L side of chest, nonradiating, no aggravating or alleviating factors.  Due to worsening of symptoms pt called 911 and brought in by EMS. Denies any recent travel. Denies fever, chills, N/V, abdominal pain, leg swelling or pain, cough. Denies any recent stressful event in life. Patient  many years ago was having chest pain and went to [facility]. States had cardiac workup and followed up outpatient with cardiologist and was told everything was normal     In the ED BP was 179/105, , T 98.3, RR 20. Patient was given morphine x1, sublingual nitroglycerin x2 43 y/o M PMH prediabetes presents with complaint of chest pain. Patient states has been having intermittent chest pressure for the past 2 weeks. Initially patient thought due to heartburn and started nexium. However today patient states was at gas station and pain worsened and had associated symptoms of shortness of breath, blurry vision, palpitations, tingling in b/l extremities and diaphoresis. Describes pain in L side of chest, nonradiating, no aggravating or alleviating factors.  Due to worsening of symptoms pt called 911 and brought in by EMS. Denies any recent travel. Denies fever, chills, N/V, abdominal pain, leg swelling or pain, cough. Denies any recent stressful event in life. Patient states many years ago was having chest pain and went to [facility]. States had cardiac workup in hospital and was told everything was normal.    In the ED BP was 179/105, , T 98.3, RR 20. Patient was given morphine x1, sublingual nitroglycerin x2

## 2022-11-28 NOTE — H&P ADULT - NSHPPHYSICALEXAM_GEN_ALL_CORE
Vitals  T(F): 98.3 (11-27-22 @ 18:52), Max: 98.3 (11-27-22 @ 18:52)  HR: 101 (11-27-22 @ 20:30) (101 - 114)  BP: 138/72 (11-27-22 @ 20:30) (138/72 - 179/105)  RR: 19 (11-27-22 @ 20:30) (19 - 20)  SpO2: 100% (11-27-22 @ 20:30) (100% - 100%)    Physical Exam   Gen: NAD, comfortable  HENT: atraumatic head and ears, no gross abnormalities of ears, mucous membranes moist, no oral lesions, neck supple without masses/goiter/lymphadenopathy  CV: RRR, nl s1/s2, no M/R/G  Pulm: nl respiratory effort, CTAB, no wheezes/crackles/rhonchi  Back: no scoliosis, lordosis, or kyphosis, no tenderness  Abd: normoactive bowel sounds in all 4 quadrants, soft, nontender, nondistended, no rebound, no guarding, no masses  Extremities: no pedal edema, pedal pulses palpable   Skin: nl warm and dry, no wounds   Neuro: A&Ox3, answering questions appropriately, PERRL, EOMI, face symmetric, sensation equal bilaterally in face, tongue midline, no dysarthria, 5/5 strength in upper and lower extremities bilaterally, sensation intact in upper and lower extremities bilaterally   Pysch: no depression, no SI, no HI

## 2022-11-28 NOTE — PATIENT PROFILE ADULT - FALL HARM RISK - UNIVERSAL INTERVENTIONS
Bed in lowest position, wheels locked, appropriate side rails in place/Call bell, personal items and telephone in reach/Instruct patient to call for assistance before getting out of bed or chair/Non-slip footwear when patient is out of bed/[place] to call system/Physically safe environment - no spills, clutter or unnecessary equipment/Purposeful Proactive Rounding/Room/bathroom lighting operational, light cord in reach

## 2022-11-28 NOTE — PROGRESS NOTE ADULT - ATTENDING COMMENTS
41 y/o M PMH prediabetes presents with complaint of chest pain    #Chest pain r/o ACS  -Ct  ASA 81 mg QD  -EKG sinus arrythmia with PVCs, no ST changes or T wave inversions  -D-dimer positive 257  -CT angio chest negative for PE  -troponin negative x2, f/u third troponin  -TTE -EF-55%  - ASCVD risk-8%  -cardio consult appreciated   -Pt is scheduled for pharmacological  stress test today    -F/U am labs

## 2022-11-28 NOTE — PROVIDER CONTACT NOTE (OTHER) - SITUATION
chest pain    left message with ans service for dr to see pt in the morning
Pt complaining of Chest pressure before medication being administered for Stress Test.

## 2022-11-28 NOTE — CONSULT NOTE ADULT - SUBJECTIVE AND OBJECTIVE BOX
HPI:  41 y/o M PMH prediabetes presents with complaint of chest pain. Patient states has been having intermittent chest pressure for the past 2 weeks. Initially patient thought due to heartburn and started nexium. However today patient states was at gas station and pain worsened and had associated symptoms of shortness of breath, blurry vision, palpitations, tingling in b/l extremities and diaphoresis. Describes pain in L side of chest, nonradiating, no aggravating or alleviating factors.  Due to worsening of symptoms pt called 911 and brought in by EMS. Denies any recent travel. Denies fever, chills, N/V, abdominal pain, leg swelling or pain, cough. Denies any recent stressful event in life. Patient states many years ago was having chest pain and went to [facility]. States had cardiac workup in hospital and was told everything was normal.    In the ED BP was 179/105, , T 98.3, RR 20. Patient was given morphine x1, sublingual nitroglycerin x2 (28 Nov 2022 01:00)    Cardiology consulted for chest pain.  Pt reports atypical chest discomfort - sharp pain not related to exertion.  duration 20-30 minutes. May have decreased w/ NTG in ED.  No relation to eating, position, palpation, position.  No dyspnea, syncope, lightheadness.    PAST MEDICAL AND SURGICAL HISTORY:  PAST MEDICAL & SURGICAL HISTORY:  Prediabetes          ALLERGIES:  Allergies    eggs (Unknown)  No Known Drug Allergies  shellfish (Unknown)    Intolerances        SOCIAL HISTORY:  Social History:  denies alcohol use, cigarette use or recreational drug use (28 Nov 2022 01:00)      FAMILY  HISTORY:  FAMILY HISTORY:  FH: HTN (hypertension) (Father)    FHx: hypertension (Sibling)        MEDICATIONS:  OUTPATIENT:  Home Medications:  Probiotic Formula oral capsule: 1 cap(s) orally once a day (27 Nov 2022 23:23)      INPATIENT:  MEDICATIONS  (STANDING):  aspirin enteric coated 81 milliGRAM(s) Oral daily  heparin   Injectable 5000 Unit(s) SubCutaneous every 12 hours    MEDICATIONS  (PRN):  aluminum hydroxide/magnesium hydroxide/simethicone Suspension 30 milliLiter(s) Oral every 4 hours PRN Dyspepsia  hydrALAZINE Injectable 10 milliGRAM(s) IV Push every 6 hours PRN Hypertension for SBP >160  nitroglycerin     SubLingual 0.4 milliGRAM(s) SubLingual every 5 minutes PRN Chest Pain  ondansetron Injectable 4 milliGRAM(s) IV Push every 8 hours PRN Nausea and/or Vomiting    MEDICATIONS  (PRN):  aluminum hydroxide/magnesium hydroxide/simethicone Suspension 30 milliLiter(s) Oral every 4 hours PRN Dyspepsia  hydrALAZINE Injectable 10 milliGRAM(s) IV Push every 6 hours PRN Hypertension for SBP >160  nitroglycerin     SubLingual 0.4 milliGRAM(s) SubLingual every 5 minutes PRN Chest Pain  ondansetron Injectable 4 milliGRAM(s) IV Push every 8 hours PRN Nausea and/or Vomiting      REVIEW OF SYSTEMS:  ===============================  ===============================  CONSTITUTIONAL: No weakness, fevers or chills  EYES/ENT: No visual changes;  No vertigo or throat pain   NECK: No pain or stiffness  RESPIRATORY: No cough, wheezing, hemoptysis; No shortness of breath  CARDIOVASCULAR: No chest pain or palpitations  GASTROINTESTINAL: No abdominal or epigastric pain. No nausea, vomiting, or hematemesis;   No diarrhea or constipation. No melena or hematochezia.  GENITOURINARY: No dysuria, frequency or hematuria  NEUROLOGICAL: No numbness or weakness  SKIN: No itching, burning, rashes, or lesions   All other review of systems is negative unless indicated above    Vital Signs Last 24 Hrs  T(C): 36.4 (28 Nov 2022 07:25), Max: 36.9 (28 Nov 2022 01:34)  T(F): 97.6 (28 Nov 2022 07:25), Max: 98.4 (28 Nov 2022 01:34)  HR: 101 (28 Nov 2022 11:07) (72 - 101)  BP: 125/60 (28 Nov 2022 11:07) (115/75 - 138/72)  BP(mean): 85 (28 Nov 2022 07:25) (85 - 85)  RR: 18 (28 Nov 2022 11:07) (18 - 19)  SpO2: 100% (28 Nov 2022 11:07) (97% - 100%)    Parameters below as of 28 Nov 2022 11:07  Patient On (Oxygen Delivery Method): room air        PHYSICAL EXAM:    Constitutional: NAD, awake and alert, well-developed  HEENT: PERR, EOMI,  No oral cyananosis.  Neck:  supple,  No JVD  Respiratory: Breath sounds are clear bilaterally, No wheezing, rales or rhonchi  Cardiovascular: S1 and S2, regular rate and rhythm, no Murmurs, gallops or rubs  Gastrointestinal: Bowel Sounds present, soft, nontender.   Extremities: No peripheral edema. No clubbing or cyanosis.  Vascular: 2+ peripheral pulses  Neurological: A/O x 3, no focal deficits  Musculoskeletal: no calf tenderness.  Skin: No rashes.    ===============================  ===============================  LABS:                         13.7   6.53  )-----------( 195      ( 28 Nov 2022 06:58 )             40.8                         14.0   6.20  )-----------( 208      ( 27 Nov 2022 20:13 )             41.4                         See note  see note )-----------( See note    ( 27 Nov 2022 19:08 )             See note    28 Nov 2022 06:58    139    |  106    |  9      ----------------------------<  140    3.7     |  27     |  0.84   27 Nov 2022 20:13    140    |  108    |  10     ----------------------------<  136    3.8     |  29     |  1.07     Ca    8.6        28 Nov 2022 06:58  Ca    8.5        27 Nov 2022 20:13  Mg     1.8       27 Nov 2022 20:13    TPro  6.6    /  Alb  3.6    /  TBili  0.6    /  DBili  x      /  AST  53     /  ALT  108    /  AlkPhos  58     28 Nov 2022 06:58  TPro  6.9    /  Alb  3.6    /  TBili  0.2    /  DBili  x      /  AST  71     /  ALT  122    /  AlkPhos  68     27 Nov 2022 20:13        THYROID STUDIES:    ===============================  ===============================  CARDIAC BIOMARKERS:  -------  -BNP VALUES:  11-27 @ 20:13  Pro Bnp 98    -------  -TROPONIN VALUES:   Troponin I, High Sensitivity Result: 8.76 ng/L (11-28-22 @ 11:37)  Troponin I, High Sensitivity Result: 10.52 ng/L (11-28-22 @ 06:58)  Troponin I, High Sensitivity Result: 11.54 ng/L (11-27-22 @ 23:43)  Troponin I, High Sensitivity Result: 10.91 ng/L (11-27-22 @ 20:13)       ===============================  ===============================  EKG: no ischemic changes    TELE: NSR      ===============================  ECHO:    < from: TTE Echo Complete w/o Contrast w/ Doppler (11.28.22 @ 11:45) >   Impression     Summary     The left ventricle is normal in size, wall thickness, wall motion and   contractility. Estimated left ventricular ejection fraction is 55 %.   Normal appearing right ventricle structure and function.   Minimal mitral regurgitation.   Minimal tricuspid valve regurgitation.     Signature     ----------------------------------------------------------------   Electronically signed by Mike Gómez MD(Interpreting   physician) on 11/28/2022 12:13 PM   ----------------------------------------------------------------    < end of copied text >          ===============================    Luis Echeverria M.D.  Cardiology, [facility]  Cell:   Office:   [phone] ([facility] Office)  [phone] (Health system Office)    ===============================

## 2022-11-28 NOTE — PROGRESS NOTE ADULT - SUBJECTIVE AND OBJECTIVE BOX
43 y/o M PMH prediabetes presents with complaint of chest pain. Patient states has been having intermittent chest pressure for the past 2 weeks. Initially patient thought due to heartburn and started nexium. However today patient states was at gas station and pain worsened and had associated symptoms of shortness of breath, blurry vision, palpitations, tingling in b/l extremities and diaphoresis. Describes pain in L side of chest, nonradiating, no aggravating or alleviating factors.  Due to worsening of symptoms pt called 911 and brought in by EMS. Denies any recent travel. Denies fever, chills, N/V, abdominal pain, leg swelling or pain, cough. Denies any recent stressful event in life. Patient states many years ago was having chest pain and went to [facility]. States had cardiac workup in hospital and was told everything was normal. In the ED BP was 179/105, , T 98.3, RR 20. Patient was given morphine x1, sublingual nitroglycerin x2.     -Pt was seen by his bedside, awake and oriented. Pt has no new complaints at this time     Vital Signs Last 24 Hrs  T(C): 36.6 (28 Nov 2022 20:31), Max: 36.9 (28 Nov 2022 01:34)  T(F): 97.9 (28 Nov 2022 20:31), Max: 98.4 (28 Nov 2022 01:34)  HR: 71 (28 Nov 2022 20:31) (71 - 101)  BP: 139/86 (28 Nov 2022 20:31) (115/75 - 139/86)  BP(mean): 85 (28 Nov 2022 07:25) (85 - 85)  RR: 18 (28 Nov 2022 20:31) (18 - 18)  SpO2: 96% (28 Nov 2022 20:31) (96% - 100%)    Parameters below as of 28 Nov 2022 20:31  Patient On (Oxygen Delivery Method): room air    Physical exam   GENERAL: NAD, lying in bed comfortably  HEAD:  Atraumatic, Normocephalic  EYES: EOMI, PERRLA, conjunctiva and sclera clear  ENT: Moist mucous membranes  NECK: Supple, No JVD  CHEST/LUNG: Clear to auscultation bilaterally; No rales, rhonchi, wheezing, or rubs. Unlabored respirations  HEART: Regular rate and rhythm; No murmurs, rubs, or gallops  ABDOMEN: Bowel sounds present; Soft, Nontender, Nondistended. No hepatomegally  EXTREMITIES:  2+ Peripheral Pulses, brisk capillary refill. No clubbing, cyanosis, or edema  NERVOUS SYSTEM:  Alert & Oriented X3, speech clear. No deficits   MSK: FROM all 4 extremities, full and equal strength  SKIN: No rashes or lesions    LABS                   13.7   6.53  )-----------( 195      ( 28 Nov 2022 06:58 )             40.8     11-28    139  |  106  |  9   ----------------------------<  140<H>  3.7   |  27  |  0.84    Ca    8.6      28 Nov 2022 06:58  Mg     1.8     11-27    TPro  6.6  /  Alb  3.6  /  TBili  0.6  /  DBili  x   /  AST  53<H>  /  ALT  108<H>  /  AlkPhos  58  11-28    MEDICATIONS  (STANDING):  aspirin enteric coated 81 milliGRAM(s) Oral daily  heparin   Injectable 5000 Unit(s) SubCutaneous every 12 hours    MEDICATIONS  (PRN):  aluminum hydroxide/magnesium hydroxide/simethicone Suspension 30 milliLiter(s) Oral every 4 hours PRN Dyspepsia  hydrALAZINE Injectable 10 milliGRAM(s) IV Push every 6 hours PRN Hypertension for SBP >160  nitroglycerin     SubLingual 0.4 milliGRAM(s) SubLingual every 5 minutes PRN Chest Pain  ondansetron Injectable 4 milliGRAM(s) IV Push every 8 hours PRN Nausea and/or Vomiting

## 2022-11-28 NOTE — PROVIDER CONTACT NOTE (OTHER) - ACTION/TREATMENT ORDERED:
Dr. Conner aware. Continue plan for ST. Get EKG and Troponin STAT. Give patient sublingual Nitro if pain continues. will continue to monitor.

## 2022-11-28 NOTE — PROGRESS NOTE ADULT - ASSESSMENT
43 y/o M PMH prediabetes presents with complaint of chest pain  #Chest pain r/o ACS  -Ct  ASA 81 mg QD  -EKG sinus arrythmia with PVCs, no ST changes or T wave inversions  -D-dimer positive 257  -CT angio chest negative for PE  -troponin negative x2, f/u third troponin  -TTE -EF-55%  - ASCVD risk-8%  -cardio consult appreciated   -Pt is scheduled for pharmacological  stress test today    -F/U am labs     #Elevated BP without history of HTN  -/105 on admission, improved to 133/97  -monitor BP  -will add PRN hydralazine for systolic BP >160  -consider adding antihypertensive agent if persistently elevated BP    #Transaminitis  -Probably due to hepatic steatosis   -AST 71,    -hepatitis panel negative   -CTA shows hepatic steatosis  -if persistently elevated LFTs, consider ordering RUQ US    #Prediabetes  -glucose 136  -A1c-6.7    #DVT prophylaxis  -heparin subq    -A/P was discussed with Dr. Olivas

## 2022-11-29 ENCOUNTER — TRANSCRIPTION ENCOUNTER (OUTPATIENT)
Age: 42
End: 2022-11-29

## 2022-11-29 VITALS
DIASTOLIC BLOOD PRESSURE: 76 MMHG | RESPIRATION RATE: 18 BRPM | SYSTOLIC BLOOD PRESSURE: 120 MMHG | TEMPERATURE: 98 F | HEART RATE: 60 BPM | OXYGEN SATURATION: 99 %

## 2022-11-29 DIAGNOSIS — R73.03 PREDIABETES: ICD-10-CM

## 2022-11-29 LAB
ALBUMIN SERPL ELPH-MCNC: 3.7 G/DL — SIGNIFICANT CHANGE UP (ref 3.3–5)
ALP SERPL-CCNC: 66 U/L — SIGNIFICANT CHANGE UP (ref 40–120)
ALT FLD-CCNC: 124 U/L — HIGH (ref 12–78)
AST SERPL-CCNC: 72 U/L — HIGH (ref 15–37)
BILIRUB SERPL-MCNC: 0.6 MG/DL — SIGNIFICANT CHANGE UP (ref 0.2–1.2)
BUN SERPL-MCNC: 14 MG/DL — SIGNIFICANT CHANGE UP (ref 7–23)
CALCIUM SERPL-MCNC: 9.2 MG/DL — SIGNIFICANT CHANGE UP (ref 8.5–10.1)
CHLORIDE SERPL-SCNC: 106 MMOL/L — SIGNIFICANT CHANGE UP (ref 96–108)
CO2 SERPL-SCNC: 28 MMOL/L — SIGNIFICANT CHANGE UP (ref 22–31)
CREAT SERPL-MCNC: 1.05 MG/DL — SIGNIFICANT CHANGE UP (ref 0.5–1.3)
EGFR: 91 ML/MIN/1.73M2 — SIGNIFICANT CHANGE UP
GLUCOSE SERPL-MCNC: 123 MG/DL — HIGH (ref 70–99)
HCT VFR BLD CALC: 43.2 % — SIGNIFICANT CHANGE UP (ref 39–50)
HGB BLD-MCNC: 14.6 G/DL — SIGNIFICANT CHANGE UP (ref 13–17)
MCHC RBC-ENTMCNC: 28.2 PG — SIGNIFICANT CHANGE UP (ref 27–34)
MCHC RBC-ENTMCNC: 33.8 GM/DL — SIGNIFICANT CHANGE UP (ref 32–36)
MCV RBC AUTO: 83.4 FL — SIGNIFICANT CHANGE UP (ref 80–100)
PLATELET # BLD AUTO: 200 K/UL — SIGNIFICANT CHANGE UP (ref 150–400)
POTASSIUM SERPL-MCNC: 4 MMOL/L — SIGNIFICANT CHANGE UP (ref 3.5–5.3)
POTASSIUM SERPL-SCNC: 4 MMOL/L — SIGNIFICANT CHANGE UP (ref 3.5–5.3)
PROT SERPL-MCNC: 7.4 GM/DL — SIGNIFICANT CHANGE UP (ref 6–8.3)
RBC # BLD: 5.18 M/UL — SIGNIFICANT CHANGE UP (ref 4.2–5.8)
RBC # FLD: 13.2 % — SIGNIFICANT CHANGE UP (ref 10.3–14.5)
SODIUM SERPL-SCNC: 136 MMOL/L — SIGNIFICANT CHANGE UP (ref 135–145)
WBC # BLD: 5.03 K/UL — SIGNIFICANT CHANGE UP (ref 3.8–10.5)
WBC # FLD AUTO: 5.03 K/UL — SIGNIFICANT CHANGE UP (ref 3.8–10.5)

## 2022-11-29 PROCEDURE — 99238 HOSP IP/OBS DSCHRG MGMT 30/<: CPT | Mod: GC

## 2022-11-29 PROCEDURE — 93018 CV STRESS TEST I&R ONLY: CPT

## 2022-11-29 PROCEDURE — 78452 HT MUSCLE IMAGE SPECT MULT: CPT | Mod: 26

## 2022-11-29 PROCEDURE — 99233 SBSQ HOSP IP/OBS HIGH 50: CPT

## 2022-11-29 PROCEDURE — 93016 CV STRESS TEST SUPVJ ONLY: CPT

## 2022-11-29 PROCEDURE — 76700 US EXAM ABDOM COMPLETE: CPT | Mod: 26

## 2022-11-29 RX ORDER — PANTOPRAZOLE SODIUM 20 MG/1
40 TABLET, DELAYED RELEASE ORAL
Refills: 0 | Status: DISCONTINUED | OUTPATIENT
Start: 2022-11-29 | End: 2022-11-29

## 2022-11-29 RX ORDER — ATORVASTATIN CALCIUM 80 MG/1
1 TABLET, FILM COATED ORAL
Qty: 30 | Refills: 0
Start: 2022-11-29 | End: 2022-12-28

## 2022-11-29 RX ORDER — REGADENOSON 0.08 MG/ML
0.4 INJECTION, SOLUTION INTRAVENOUS ONCE
Refills: 0 | Status: COMPLETED | OUTPATIENT
Start: 2022-11-29 | End: 2022-11-29

## 2022-11-29 RX ORDER — ACETAMINOPHEN 500 MG
650 TABLET ORAL EVERY 6 HOURS
Refills: 0 | Status: DISCONTINUED | OUTPATIENT
Start: 2022-11-29 | End: 2022-11-29

## 2022-11-29 RX ORDER — L.ACIDOPH/B.ANIMALIS/B.LONGUM 15B CELL
1 CAPSULE ORAL
Qty: 0 | Refills: 0 | DISCHARGE

## 2022-11-29 RX ADMIN — REGADENOSON 0.4 MILLIGRAM(S): 0.08 INJECTION, SOLUTION INTRAVENOUS at 09:05

## 2022-11-29 RX ADMIN — Medication 650 MILLIGRAM(S): at 15:22

## 2022-11-29 RX ADMIN — Medication 81 MILLIGRAM(S): at 12:05

## 2022-11-29 RX ADMIN — Medication 650 MILLIGRAM(S): at 16:22

## 2022-11-29 NOTE — PROGRESS NOTE ADULT - PROBLEM SELECTOR PLAN 1
·  Problem: Atypical chest pain.   ·  Recommendation: Patient continues to report intermittent chest pressure, without pattern, EKG normal, troponin negative x 3  .  No events noted on tele  .  Continue aspirin  .  St[facility] test results pending, [facility]

## 2022-11-29 NOTE — CHART NOTE - NSCHARTNOTEFT_GEN_A_CORE
REGADENASON STRESS TEST REPORT    MOY OSMAN 42yM  MRN: [id number]  : 80  Admit: 22    Regadenoson was 5 cc (0.4 mg Regadenoson) which was given rapidly over 10 seconds  into a peripheral IV.  Immediately after Regadenoson injection, flush w/ 5 cc saline given.  Radiopharmaceutical was injected 10-20 sec after the saline flush.  Patient was monitored for 6 minutes.  A 12 lead ECG was recorded every minute & BP was recorded throughout the test.      Resting ECG:  NSR  Peak infusion ECG:  NSR  Chest pain: none    CONCLUSION:  Normal response to IV lexiscan.  See myocardial perfusion scan report.

## 2022-11-29 NOTE — DISCHARGE NOTE NURSING/CASE MANAGEMENT/SOCIAL WORK - NSDCPEFALRISK_GEN_ALL_CORE
For information on Fall & Injury Prevention, visit: [email or web address] OR  [email or web address] OR  https://www.cdc.gov/steadi/patient.html

## 2022-11-29 NOTE — DISCHARGE NOTE PROVIDER - HOSPITAL COURSE
Patient is a 41 y/o M with a PMH of prediabetes who presented on 11/27/22 with complaints of chest pain. Patient stated he had been having intermittent chest pressure for the past 2 weeks prior to admission which he initially thought was due to heartburn and started Nexium. However, the day of admission the pain worsened and had associated symptoms of shortness of breath, blurry vision, palpitations, tingling in b/l extremities and diaphoresis.  Denies any recent travel. Denies fever, chills, N/V, abdominal pain, leg swelling or pain, cough. Denies any recent stressful event in life. Patient states many years ago was having chest pain and went to [facility]. States had cardiac workup in hospital and was told everything was normal.    In the ED BP was 179/105, , T 98.3, RR 20. Patient was given morphine x1, sublingual nitroglycerin x2   Patient is a 43 y/o M with a PMH of prediabetes who presented on 11/27/22 with complaints of chest pain. Patient stated he had been having intermittent chest pressure for the past 2 weeks prior to admission which he initially thought was due to heartburn and started Nexium. However, the day of admission the pain worsened and he had associated symptoms of shortness of breath, blurry vision, palpitations, tingling in b/l extremities and diaphoresis. Patient was brought to the ED by EMS. Upon arrival to the ED, he received aspirin and 2 doses of nitroglycerin. He was admitted to rule out acute coronary syndrome. He was evaluated by cardiology. EKG did not show any ST segment elevations or T-wave inversions. Troponins were negative x 3. Patient had an echocardiogram done which revealed an EF of 55% which normal functioning left and right ventricle. He underwent a nuclear stress test as well. There was no scan evidence of reversible or fixed perfusion defects and no regional wall motion abnormalities were seen. Of note, patient would benefit from statin therapy but given his elevated LFTs and abdominal US showing fatty liver/fibrosis he will be started on a low-dose statin instead of the recommended high-intensity statin.     11/29/22: Today is hospital day 2. Patient seen and examined at bedside. He is hemodynamically stable and medically clear for discharge.     Vital Signs Last 24 Hrs  T(C): 36.7 (29 Nov 2022 08:13), Max: 36.7 (29 Nov 2022 08:13)  T(F): 98.1 (29 Nov 2022 08:13), Max: 98.1 (29 Nov 2022 08:13)  HR: 60 (29 Nov 2022 08:13) (60 - 71)  BP: 120/76 (29 Nov 2022 08:13) (120/76 - 139/86)  RR: 18 (29 Nov 2022 08:13) (18 - 18)  SpO2: 99% (29 Nov 2022 08:13) (96% - 99%)    Parameters below as of 29 Nov 2022 08:13  Patient On (Oxygen Delivery Method): room air

## 2022-11-29 NOTE — DISCHARGE NOTE PROVIDER - NSDCCPCAREPLAN_GEN_ALL_CORE_FT
PRINCIPAL DISCHARGE DIAGNOSIS  Diagnosis: Chest pain  Assessment and Plan of Treatment: You were sangita to the ED due to the chest pain. Upon arrival to the ED you received medication to help alleviate that pain (nitroglycerin and aspi       PRINCIPAL DISCHARGE DIAGNOSIS  Diagnosis: Chest pain  Assessment and Plan of Treatment: You were brought to the ED due to the chest pain. Upon arrival to the ED you received medication to help alleviate that pain (nitroglycerin and aspirin). You underwent cardiac testing which included an echocardiogram. This is an ultrasound of your heart and it looks at how well your heart pumps. Your heart pumping function was normal. You also underwent a stress test which came back negative. Moving forward we need to focus on decreasing your risk factors for heart disease which include prediabetes and elevated cholesterol levels. As of now you have elevated liver function testing due to fatty liver which was seen on ultrasound, so we will start you on a low dose cholesterol lowering medication by the name of atorvastatin to be taken at bedtime.  Please follow-up with your PCP within 1 week of discharge to discuss your recent hospital admisison.

## 2022-11-29 NOTE — PROGRESS NOTE ADULT - PROBLEM SELECTOR PLAN 2
.  Being managed by primary team  .  Patient is currently not on statin therapy.  LFT's mildly elevated.  CTA- hepatic steatosis. D/W  who will order RUQ US.  Low dose statin will be considered pending results of sonogram

## 2022-11-29 NOTE — DISCHARGE NOTE PROVIDER - NSDCMRMEDTOKEN_GEN_ALL_CORE_FT
Probiotic Formula oral capsule: 1 cap(s) orally once a day   atorvastatin 20 mg oral tablet: 1 tab(s) orally once a day (at bedtime)

## 2022-11-29 NOTE — PROGRESS NOTE ADULT - SUBJECTIVE AND OBJECTIVE BOX
HPI:  41 y/o M PMH prediabetes presents with complaint of chest pain. Patient states has been having intermittent chest pressure for the past 2 weeks. Initially patient thought due to heartburn and started nexium. However today patient states was at gas station and pain worsened and had associated symptoms of shortness of breath, blurry vision, palpitations, tingling in b/l extremities and diaphoresis. Describes pain in L side of chest, nonradiating, no aggravating or alleviating factors.  Due to worsening of symptoms pt called 911 and brought in by EMS. Denies any recent travel. Denies fever, chills, N/V, abdominal pain, leg swelling or pain, cough. Denies any recent stressful event in life. Patient states many years ago was having chest pain and went to [facility]. States had cardiac workup in hospital and was told everything was normal.    In the ED BP was 179/105, , T 98.3, RR 20. Patient was given morphine x1, sublingual nitroglycerin x2 (28 Nov 2022 01:00)    Cardiology consulted for chest pain.  Pt reports atypical chest discomfort - sharp pain not related to exertion.  duration 20-30 minutes. May have decreased w/ NTG in ED.  No relation to eating, position, palpation, position.  No dyspnea, syncope, lightheadness.    11/29/22: Patient returned from 2nd part of stress test. Continues to report intermittent chest pressure and tingling in upper and lower extremities. Tele RSR PVC's    PAST MEDICAL AND SURGICAL HISTORY:  PAST MEDICAL & SURGICAL HISTORY:  Prediabetes          ALLERGIES:  Allergies    eggs (Unknown)  No Known Drug Allergies  shellfish (Unknown)    Intolerances        SOCIAL HISTORY:  Social History:  denies alcohol use, cigarette use or recreational drug use (28 Nov 2022 01:00)      FAMILY  HISTORY:  FAMILY HISTORY:  FH: HTN (hypertension) (Father)    FHx: hypertension (Sibling)        MEDICATIONS:  OUTPATIENT:  Home Medications:  Probiotic Formula oral capsule: 1 cap(s) orally once a day (27 Nov 2022 23:23)      MEDICATIONS  (STANDING):  aspirin enteric coated 81 milliGRAM(s) Oral daily  dextrose 5%. 1000 milliLiter(s) (100 mL/Hr) IV Continuous <Continuous>  dextrose 5%. 1000 milliLiter(s) (50 mL/Hr) IV Continuous <Continuous>  dextrose 50% Injectable 25 Gram(s) IV Push once  dextrose 50% Injectable 12.5 Gram(s) IV Push once  dextrose 50% Injectable 25 Gram(s) IV Push once  glucagon  Injectable 1 milliGRAM(s) IntraMuscular once  heparin   Injectable 5000 Unit(s) SubCutaneous every 12 hours  insulin lispro (ADMELOG) corrective regimen sliding scale   SubCutaneous three times a day before meals  insulin lispro (ADMELOG) corrective regimen sliding scale   SubCutaneous at bedtime    MEDICATIONS  (PRN):  aluminum hydroxide/magnesium hydroxide/simethicone Suspension 30 milliLiter(s) Oral every 4 hours PRN Dyspepsia  dextrose Oral Gel 15 Gram(s) Oral once PRN Blood Glucose LESS THAN 70 milliGRAM(s)/deciliter  hydrALAZINE Injectable 10 milliGRAM(s) IV Push every 6 hours PRN Hypertension for SBP >160  nitroglycerin     SubLingual 0.4 milliGRAM(s) SubLingual every 5 minutes PRN Chest Pain  ondansetron Injectable 4 milliGRAM(s) IV Push every 8 hours PRN Nausea and/or Vomiting      Vital Signs Last 24 Hrs  T(C): 36.7 (29 Nov 2022 08:13), Max: 36.7 (29 Nov 2022 08:13)  T(F): 98.1 (29 Nov 2022 08:13), Max: 98.1 (29 Nov 2022 08:13)  HR: 60 (29 Nov 2022 08:13) (60 - 71)  BP: 120/76 (29 Nov 2022 08:13) (120/76 - 139/86)  BP(mean): --  RR: 18 (29 Nov 2022 08:13) (18 - 18)  SpO2: 99% (29 Nov 2022 08:13) (96% - 99%)    Parameters below as of 29 Nov 2022 08:13  Patient On (Oxygen Delivery Method): room air      PHYSICAL EXAM:    Constitutional: NAD, awake and alert, well-developed  HEENT: PERR, EOMI,  No oral cyananosis.  Neck:  supple,  No JVD  Respiratory: Breath sounds are clear bilaterally, No wheezing, rales or rhonchi  Cardiovascular: S1 and S2, regular rate and rhythm, no Murmurs, gallops or rubs  Gastrointestinal: Bowel Sounds present, soft, nontender.   Extremities: No peripheral edema. No clubbing or cyanosis.  Vascular: 2+ peripheral pulses  Neurological: A/O x 3, no focal deficits  Musculoskeletal: no calf tenderness.  Skin: No rashes.    ===============================  ===============================  LABS:                                             13.7   6.53  )-----------( 195      ( 28 Nov 2022 06:58 )             40.8                         14.0   6.20  )-----------( 208      ( 27 Nov 2022 20:13 )             41.4                         See note  see note )-----------( See note    ( 27 Nov 2022 19:08 )             See note      28 Nov 2022 06:58    139    |  106    |  9      ----------------------------<  140    3.7     |  27     |  0.84   27 Nov 2022 20:13    140    |  108    |  10     ----------------------------<  136    3.8     |  29     |  1.07     Ca    8.6        28 Nov 2022 06:58  Ca    8.5        27 Nov 2022 20:13  Mg     1.8       27 Nov 2022 20:13    TPro  6.6    /  Alb  3.6    /  TBili  0.6    /  DBili  x      /  AST  53     /  ALT  108    /  AlkPhos  58     28 Nov 2022 06:58  TPro  6.9    /  Alb  3.6    /  TBili  0.2    /  DBili  x      /  AST  71     /  ALT  122    /  AlkPhos  68     27 Nov 2022 20:13        THYROID STUDIES:    ===============================  ===============================  CARDIAC BIOMARKERS:  -------  -BNP VALUES:  11-27 @ 20:13  Pro Bnp 98    -------  -TROPONIN VALUES:   Troponin I, High Sensitivity Result: 8.76 ng/L (11-28-22 @ 11:37)  Troponin I, High Sensitivity Result: 10.52 ng/L (11-28-22 @ 06:58)  Troponin I, High Sensitivity Result: 11.54 ng/L (11-27-22 @ 23:43)  Troponin I, High Sensitivity Result: 10.91 ng/L (11-27-22 @ 20:13)       ===============================  ===============================  EKG: no ischemic changes    TELE: NSR      ===============================  ECHO:    < from: TTE Echo Complete w/o Contrast w/ Doppler (11.28.22 @ 11:45) >   Impression     Summary     The left ventricle is normal in size, wall thickness, wall motion and   contractility. Estimated left ventricular ejection fraction is 55 %.   Normal appearing right ventricle structure and function.   Minimal mitral regurgitation.   Minimal tricuspid valve regurgitation.            HPI:  41 y/o M PMH prediabetes presents with complaint of chest pain. Patient states has been having intermittent chest pressure for the past 2 weeks. Initially patient thought due to heartburn and started nexium. However today patient states was at gas station and pain worsened and had associated symptoms of shortness of breath, blurry vision, palpitations, tingling in b/l extremities and diaphoresis. Describes pain in L side of chest, nonradiating, no aggravating or alleviating factors.  Due to worsening of symptoms pt called 911 and brought in by EMS. Denies any recent travel. Denies fever, chills, N/V, abdominal pain, leg swelling or pain, cough. Denies any recent stressful event in life. Patient states many years ago was having chest pain and went to [facility]. States had cardiac workup in hospital and was told everything was normal.    In the ED BP was 179/105, , T 98.3, RR 20. Patient was given morphine x1, sublingual nitroglycerin x2 (28 Nov 2022 01:00)    Cardiology consulted for chest pain.  Pt reports atypical chest discomfort - sharp pain not related to exertion.  duration 20-30 minutes. May have decreased w/ NTG in ED.  No relation to eating, position, palpation, position.  No dyspnea, syncope, lightheadness.    11/29/22: Patient returned from 2nd part of stress test. Continues to report intermittent chest pressure and tingling in upper and lower extremities. Tele RSR PVC's    PAST MEDICAL AND SURGICAL HISTORY:  PAST MEDICAL & SURGICAL HISTORY:  Prediabetes          ALLERGIES:  Allergies    eggs (Unknown)  No Known Drug Allergies  shellfish (Unknown)    Intolerances        SOCIAL HISTORY:  Social History:  denies alcohol use, cigarette use or recreational drug use (28 Nov 2022 01:00)        FAMILY HISTORY:  FH: HTN (hypertension) (Father)    FHx: hypertension (Sibling)        MEDICATIONS:  OUTPATIENT:  Home Medications:  Probiotic Formula oral capsule: 1 cap(s) orally once a day (27 Nov 2022 23:23)      MEDICATIONS  (STANDING):  aspirin enteric coated 81 milliGRAM(s) Oral daily  dextrose 5%. 1000 milliLiter(s) (100 mL/Hr) IV Continuous <Continuous>  dextrose 5%. 1000 milliLiter(s) (50 mL/Hr) IV Continuous <Continuous>  dextrose 50% Injectable 25 Gram(s) IV Push once  dextrose 50% Injectable 12.5 Gram(s) IV Push once  dextrose 50% Injectable 25 Gram(s) IV Push once  glucagon  Injectable 1 milliGRAM(s) IntraMuscular once  heparin   Injectable 5000 Unit(s) SubCutaneous every 12 hours  insulin lispro (ADMELOG) corrective regimen sliding scale   SubCutaneous three times a day before meals  insulin lispro (ADMELOG) corrective regimen sliding scale   SubCutaneous at bedtime    MEDICATIONS  (PRN):  aluminum hydroxide/magnesium hydroxide/simethicone Suspension 30 milliLiter(s) Oral every 4 hours PRN Dyspepsia  dextrose Oral Gel 15 Gram(s) Oral once PRN Blood Glucose LESS THAN 70 milliGRAM(s)/deciliter  hydrALAZINE Injectable 10 milliGRAM(s) IV Push every 6 hours PRN Hypertension for SBP >160  nitroglycerin     SubLingual 0.4 milliGRAM(s) SubLingual every 5 minutes PRN Chest Pain  ondansetron Injectable 4 milliGRAM(s) IV Push every 8 hours PRN Nausea and/or Vomiting      Vital Signs Last 24 Hrs  T(C): 36.7 (29 Nov 2022 08:13), Max: 36.7 (29 Nov 2022 08:13)  T(F): 98.1 (29 Nov 2022 08:13), Max: 98.1 (29 Nov 2022 08:13)  HR: 60 (29 Nov 2022 08:13) (60 - 71)  BP: 120/76 (29 Nov 2022 08:13) (120/76 - 139/86)  BP(mean): --  RR: 18 (29 Nov 2022 08:13) (18 - 18)  SpO2: 99% (29 Nov 2022 08:13) (96% - 99%)    Parameters below as of 29 Nov 2022 08:13  Patient On (Oxygen Delivery Method): room air      PHYSICAL EXAM:    Constitutional: NAD, awake and alert, well-developed  HEENT: PERR, EOMI,  No oral cyananosis.  Neck:  supple,  No JVD  Respiratory: Breath sounds are clear bilaterally, No wheezing, rales or rhonchi  Cardiovascular: S1 and S2, regular rate and rhythm, no Murmurs, gallops or rubs  Gastrointestinal: Bowel Sounds present, soft, nontender.   Extremities: No peripheral edema. No clubbing or cyanosis.  Vascular: 2+ peripheral pulses  Neurological: A/O x 3, no focal deficits  Musculoskeletal: no calf tenderness.  Skin: No rashes.    ===============================  ===============================  LABS:                                             13.7   6.53  )-----------( 195      ( 28 Nov 2022 06:58 )             40.8                         14.0   6.20  )-----------( 208      ( 27 Nov 2022 20:13 )             41.4                         See note  see note )-----------( See note    ( 27 Nov 2022 19:08 )             See note      28 Nov 2022 06:58    139    |  106    |  9      ----------------------------<  140    3.7     |  27     |  0.84   27 Nov 2022 20:13    140    |  108    |  10     ----------------------------<  136    3.8     |  29     |  1.07     Ca    8.6        28 Nov 2022 06:58  Ca    8.5        27 Nov 2022 20:13  Mg     1.8       27 Nov 2022 20:13    TPro  6.6    /  Alb  3.6    /  TBili  0.6    /  DBili  x      /  AST  53     /  ALT  108    /  AlkPhos  58     28 Nov 2022 06:58  TPro  6.9    /  Alb  3.6    /  TBili  0.2    /  DBili  x      /  AST  71     /  ALT  122    /  AlkPhos  68     27 Nov 2022 20:13        THYROID STUDIES:    ===============================  ===============================  CARDIAC BIOMARKERS:  -------  -BNP VALUES:  11-27 @ 20:13  Pro Bnp 98    -------  -TROPONIN VALUES:   Troponin I, High Sensitivity Result: 8.76 ng/L (11-28-22 @ 11:37)  Troponin I, High Sensitivity Result: 10.52 ng/L (11-28-22 @ 06:58)  Troponin I, High Sensitivity Result: 11.54 ng/L (11-27-22 @ 23:43)  Troponin I, High Sensitivity Result: 10.91 ng/L (11-27-22 @ 20:13)       ===============================  ===============================  EKG: no ischemic changes    TELE: NSR      ===============================  ECHO:    < from: TTE Echo Complete w/o Contrast w/ Doppler (11.28.22 @ 11:45) >   Impression     Summary     The left ventricle is normal in size, wall thickness, wall motion and   contractility. Estimated left ventricular ejection fraction is 55 %.   Normal appearing right ventricle structure and function.   Minimal mitral regurgitation.   Minimal tricuspid valve regurgitation.       Monitor sinus rhythm PVCS

## 2022-11-29 NOTE — PROGRESS NOTE ADULT - NS ATTEND AMEND GEN_ALL_CORE FT
Patient with atypical chest pressure  negative troponin no ST changes , normal EF , had nuclear stress test pending results , add PPI

## 2022-11-29 NOTE — DISCHARGE NOTE NURSING/CASE MANAGEMENT/SOCIAL WORK - PATIENT PORTAL LINK FT
You can access the FollowMyHealth Patient Portal offered by [facility] by registering at the following website: [email or web address]. By joining [employer]’s FollowMyHealth portal, you will also be able to view your health information using other applications (apps) compatible with our system.

## 2022-11-29 NOTE — DISCHARGE NOTE PROVIDER - CARE PROVIDER_API CALL
Mando Manrique  INTERNAL MEDICINE  [address] [place], [place] [id number]  Phone: [phone]  Fax: [phone]  Established Patient  Follow Up Time: 1 week

## 2022-12-03 DIAGNOSIS — Z91.012 ALLERGY TO EGGS: ICD-10-CM

## 2022-12-03 DIAGNOSIS — R07.89 OTHER CHEST PAIN: ICD-10-CM

## 2022-12-03 DIAGNOSIS — K74.00 HEPATIC FIBROSIS, UNSPECIFIED: ICD-10-CM

## 2022-12-03 DIAGNOSIS — K76.0 FATTY (CHANGE OF) LIVER, NOT ELSEWHERE CLASSIFIED: ICD-10-CM

## 2022-12-03 DIAGNOSIS — E78.00 PURE HYPERCHOLESTEROLEMIA, UNSPECIFIED: ICD-10-CM

## 2022-12-03 DIAGNOSIS — R73.03 PREDIABETES: ICD-10-CM

## 2022-12-03 DIAGNOSIS — Z91.013 ALLERGY TO SEAFOOD: ICD-10-CM

## 2022-12-03 DIAGNOSIS — R94.5 ABNORMAL RESULTS OF LIVER FUNCTION STUDIES: ICD-10-CM

## 2022-12-03 DIAGNOSIS — Z20.822 CONTACT WITH AND (SUSPECTED) EXPOSURE TO COVID-19: ICD-10-CM

## 2022-12-03 DIAGNOSIS — I49.3 VENTRICULAR PREMATURE DEPOLARIZATION: ICD-10-CM

## 2022-12-03 DIAGNOSIS — R03.0 ELEVATED BLOOD-PRESSURE READING, WITHOUT DIAGNOSIS OF HYPERTENSION: ICD-10-CM

## 2022-12-11 ENCOUNTER — EMERGENCY (EMERGENCY)
Facility: HOSPITAL | Age: 42
LOS: 1 days | Discharge: HOME | End: 2022-12-11
Attending: EMERGENCY MEDICINE
Payer: COMMERCIAL

## 2022-12-11 VITALS
HEART RATE: 85 BPM | RESPIRATION RATE: 17 BRPM | DIASTOLIC BLOOD PRESSURE: 82 MMHG | SYSTOLIC BLOOD PRESSURE: 155 MMHG | WEIGHT: 300.05 LBS | HEIGHT: 74 IN | TEMPERATURE: 98 F | OXYGEN SATURATION: 98 %

## 2022-12-11 VITALS
DIASTOLIC BLOOD PRESSURE: 79 MMHG | SYSTOLIC BLOOD PRESSURE: 148 MMHG | HEART RATE: 75 BPM | OXYGEN SATURATION: 98 % | RESPIRATION RATE: 16 BRPM

## 2022-12-11 LAB
ALBUMIN SERPL ELPH-MCNC: 4.7 G/DL — SIGNIFICANT CHANGE UP (ref 3.3–5.2)
ALP SERPL-CCNC: 68 U/L — SIGNIFICANT CHANGE UP (ref 40–120)
ALT FLD-CCNC: 96 U/L — HIGH
ANION GAP SERPL CALC-SCNC: 12 MMOL/L — SIGNIFICANT CHANGE UP (ref 5–17)
AST SERPL-CCNC: 55 U/L — HIGH
BASOPHILS # BLD AUTO: 0.03 K/UL — SIGNIFICANT CHANGE UP (ref 0–0.2)
BASOPHILS NFR BLD AUTO: 0.7 % — SIGNIFICANT CHANGE UP (ref 0–2)
BILIRUB SERPL-MCNC: 0.3 MG/DL — LOW (ref 0.4–2)
BUN SERPL-MCNC: 9.5 MG/DL — SIGNIFICANT CHANGE UP (ref 8–20)
CALCIUM SERPL-MCNC: 9.7 MG/DL — SIGNIFICANT CHANGE UP (ref 8.4–10.5)
CHLORIDE SERPL-SCNC: 102 MMOL/L — SIGNIFICANT CHANGE UP (ref 96–108)
CO2 SERPL-SCNC: 25 MMOL/L — SIGNIFICANT CHANGE UP (ref 22–29)
CREAT SERPL-MCNC: 1.12 MG/DL — SIGNIFICANT CHANGE UP (ref 0.5–1.3)
EGFR: 84 ML/MIN/1.73M2 — SIGNIFICANT CHANGE UP
EOSINOPHIL # BLD AUTO: 0.25 K/UL — SIGNIFICANT CHANGE UP (ref 0–0.5)
EOSINOPHIL NFR BLD AUTO: 6.2 % — HIGH (ref 0–6)
FLUAV AG NPH QL: SIGNIFICANT CHANGE UP
FLUBV AG NPH QL: SIGNIFICANT CHANGE UP
GLUCOSE SERPL-MCNC: 114 MG/DL — HIGH (ref 70–99)
HCT VFR BLD CALC: 43.8 % — SIGNIFICANT CHANGE UP (ref 39–50)
HGB BLD-MCNC: 14.9 G/DL — SIGNIFICANT CHANGE UP (ref 13–17)
IMM GRANULOCYTES NFR BLD AUTO: 0.2 % — SIGNIFICANT CHANGE UP (ref 0–0.9)
LYMPHOCYTES # BLD AUTO: 1.5 K/UL — SIGNIFICANT CHANGE UP (ref 1–3.3)
LYMPHOCYTES # BLD AUTO: 37 % — SIGNIFICANT CHANGE UP (ref 13–44)
MAGNESIUM SERPL-MCNC: 1.8 MG/DL — SIGNIFICANT CHANGE UP (ref 1.6–2.6)
MCHC RBC-ENTMCNC: 27.5 PG — SIGNIFICANT CHANGE UP (ref 27–34)
MCHC RBC-ENTMCNC: 34 GM/DL — SIGNIFICANT CHANGE UP (ref 32–36)
MCV RBC AUTO: 80.8 FL — SIGNIFICANT CHANGE UP (ref 80–100)
MONOCYTES # BLD AUTO: 0.38 K/UL — SIGNIFICANT CHANGE UP (ref 0–0.9)
MONOCYTES NFR BLD AUTO: 9.4 % — SIGNIFICANT CHANGE UP (ref 2–14)
NEUTROPHILS # BLD AUTO: 1.88 K/UL — SIGNIFICANT CHANGE UP (ref 1.8–7.4)
NEUTROPHILS NFR BLD AUTO: 46.5 % — SIGNIFICANT CHANGE UP (ref 43–77)
PLATELET # BLD AUTO: 238 K/UL — SIGNIFICANT CHANGE UP (ref 150–400)
POTASSIUM SERPL-MCNC: 4.5 MMOL/L — SIGNIFICANT CHANGE UP (ref 3.5–5.3)
POTASSIUM SERPL-SCNC: 4.5 MMOL/L — SIGNIFICANT CHANGE UP (ref 3.5–5.3)
PROT SERPL-MCNC: 7.3 G/DL — SIGNIFICANT CHANGE UP (ref 6.6–8.7)
RBC # BLD: 5.42 M/UL — SIGNIFICANT CHANGE UP (ref 4.2–5.8)
RBC # FLD: 13.2 % — SIGNIFICANT CHANGE UP (ref 10.3–14.5)
RSV RNA NPH QL NAA+NON-PROBE: SIGNIFICANT CHANGE UP
SARS-COV-2 RNA SPEC QL NAA+PROBE: SIGNIFICANT CHANGE UP
SODIUM SERPL-SCNC: 139 MMOL/L — SIGNIFICANT CHANGE UP (ref 135–145)
TROPONIN T SERPL-MCNC: <0.01 NG/ML — SIGNIFICANT CHANGE UP (ref 0–0.06)
TSH SERPL-MCNC: 1.4 UIU/ML — SIGNIFICANT CHANGE UP (ref 0.27–4.2)
WBC # BLD: 4.05 K/UL — SIGNIFICANT CHANGE UP (ref 3.8–10.5)
WBC # FLD AUTO: 4.05 K/UL — SIGNIFICANT CHANGE UP (ref 3.8–10.5)

## 2022-12-11 PROCEDURE — 84443 ASSAY THYROID STIM HORMONE: CPT

## 2022-12-11 PROCEDURE — 93010 ELECTROCARDIOGRAM REPORT: CPT

## 2022-12-11 PROCEDURE — 99285 EMERGENCY DEPT VISIT HI MDM: CPT | Mod: 25

## 2022-12-11 PROCEDURE — 80053 COMPREHEN METABOLIC PANEL: CPT

## 2022-12-11 PROCEDURE — 71045 X-RAY EXAM CHEST 1 VIEW: CPT | Mod: 26

## 2022-12-11 PROCEDURE — 96374 THER/PROPH/DIAG INJ IV PUSH: CPT

## 2022-12-11 PROCEDURE — 83735 ASSAY OF MAGNESIUM: CPT

## 2022-12-11 PROCEDURE — 82962 GLUCOSE BLOOD TEST: CPT

## 2022-12-11 PROCEDURE — 85025 COMPLETE CBC W/AUTO DIFF WBC: CPT

## 2022-12-11 PROCEDURE — 93005 ELECTROCARDIOGRAM TRACING: CPT

## 2022-12-11 PROCEDURE — 99285 EMERGENCY DEPT VISIT HI MDM: CPT

## 2022-12-11 PROCEDURE — 87637 SARSCOV2&INF A&B&RSV AMP PRB: CPT

## 2022-12-11 PROCEDURE — 84484 ASSAY OF TROPONIN QUANT: CPT

## 2022-12-11 PROCEDURE — 71045 X-RAY EXAM CHEST 1 VIEW: CPT

## 2022-12-11 PROCEDURE — 36415 COLL VENOUS BLD VENIPUNCTURE: CPT

## 2022-12-11 RX ORDER — KETOROLAC TROMETHAMINE 30 MG/ML
15 SYRINGE (ML) INJECTION ONCE
Refills: 0 | Status: DISCONTINUED | OUTPATIENT
Start: 2022-12-11 | End: 2022-12-11

## 2022-12-11 RX ADMIN — Medication 15 MILLIGRAM(S): at 16:00

## 2022-12-11 RX ADMIN — Medication 15 MILLIGRAM(S): at 16:15

## 2022-12-11 NOTE — ED PROVIDER NOTE - NS ED ROS FT
Constitutional: no fever, no chills  Head: NC, AT   Eyes: no redness   ENMT: no nasal congestion/drainage, no sore throat   CV: +chest pain, no edema, palpitations  Resp: no cough, no dyspnea  GI: no abdominal pain, no nausea, no vomiting, no diarrhea  : no dysuria, no hematuria   Skin: no lesions, no rashes   Neuro: no LOC, no headache, no sensory deficits, no weakness

## 2022-12-11 NOTE — ED ADULT TRIAGE NOTE - CHIEF COMPLAINT QUOTE
Patient complaining of chest discomfort, patient on Halter monitor. States he is also having generalized body aches and shortness of breath. Recently was at [place] 2 weeks ago complaining of similar symptoms.

## 2022-12-11 NOTE — ED PROVIDER NOTE - OBJECTIVE STATEMENT
42 y m with ho pDM, hld presenting for chest discomfort. Was recently discharged from [facility] for the same. Symptoms have been ongoing since leaving the hospital about a week ago. Chest pain was worse this morning which is why he came in. Endorses palpitations and SOB. PVCs on monitor. Denies diaphoresis, n/v, ab pain, cough, congestion, dysuria. Had normal echo, stress, CT A chest at previous hospitalization.

## 2022-12-11 NOTE — ED ADULT NURSE NOTE - COVID-19  TEST TYPE
Dr Vu in 3mo    Optimize Metformin ER to 1000mg BID with meals  Add back the exercise, QuickFIt     If the A1C is not tot goal in 3mo, consider adding on GLP-1 (Trulicity or Ozempic)     Stop the Levothyroxine and recheck TSH in 6 weeks.  Very important we recehck the TSH to make sure you dont need it.     Thanks    ED   
MOLECULAR PCR

## 2022-12-11 NOTE — ED PROVIDER NOTE - PATIENT PORTAL LINK FT
You can access the FollowMyHealth Patient Portal offered by [facility] by registering at the following website: [email or web address]. By joining [employer]’s FollowMyHealth portal, you will also be able to view your health information using other applications (apps) compatible with our system.

## 2022-12-11 NOTE — ED ADULT NURSE NOTE - CHIEF COMPLAINT QUOTE
Patient complaining of chest discomfort, patient on Halter monitor. States he is also having generalized body aches and shortness of breath. Recently was at [place] 2 weeks ago complaining of similar symptoms.

## 2022-12-11 NOTE — ED PROVIDER NOTE - NSFOLLOWUPINSTRUCTIONS_ED_ALL_ED_FT
Follow up with your cardiologist.    Premature Ventricular Contraction    The heart showing the ventricles, with arrows to show the electrical pathway through the heart muscle.   A premature ventricular contraction (PVC) is a common kind of irregular heartbeat (arrhythmia). These contractions are extra heartbeats that start in the ventricles of the heart and occur too early in the normal sequence. During the PVC, the heart's normal electrical pathway is not used, so the beat is shorter and less effective. In most cases, these contractions come and go and do not require treatment.      What are the causes?    Common causes of the condition include:  •Smoking.      •Drinking alcohol.      •Certain medicines.      •Some illegal drugs.      •Stress.      •Caffeine.      Certain medical conditions can also cause PVCs:  •Heart failure.      •Heart attack, or coronary artery disease.      •Heart valve problems.      •Changes in minerals in the blood (electrolytes).      •Low blood oxygen levels or high carbon dioxide levels.      In many cases, the cause of this condition is not known.      What are the signs or symptoms?    The main symptom of this condition is fast or skipped heartbeats (palpitations). Other symptoms include:  •Chest pain.      •Shortness of breath.      •Feeling tired.      •Dizziness.      •Difficulty exercising.      In some cases, there are no symptoms.      How is this diagnosed?    This condition may be diagnosed based on:  •Your medical history.      •A physical exam. During the exam, the health care provider will check for irregular heartbeats.    •Tests, such as:  •An ECG (electrocardiogram) to monitor the electrical activity of your heart.      •An ambulatory cardiac monitor. This device records your heartbeats for 24 hours or more.      •Stress tests to see how exercise affects your heart rhythm and blood supply.      •An echocardiogram. This test uses sound waves (ultrasound) to produce an image of your heart.      •An electrophysiology study (EPS). This test checks for electrical problems in your heart.          How is this treated?    Treatment for this condition depends on any underlying conditions, the type of PVCs that you are having, and how much the symptoms are interfering with your daily life.    Possible treatments include:  •Avoiding things that cause premature contractions (triggers). These include caffeine and alcohol.      •Taking medicines if symptoms are severe or if the extra heartbeats are frequent.      •Getting treatment for underlying conditions that cause PVCs.      •Having an implantable cardioverter defibrillator (ICD), if you are at risk for a serious arrhythmia. The ICD is a small device that is inserted into your chest to monitor your heartbeat. When it senses an irregular heartbeat, it sends a shock to bring the heartbeat back to normal.      •Having a procedure to destroy the portion of the heart tissue that sends out abnormal signals (catheter ablation).      In some cases, no treatment is required.      Follow these instructions at home:    Lifestyle     • Do not use any products that contain nicotine or tobacco, such as cigarettes, e-cigarettes, and chewing tobacco. If you need help quitting, ask your health care provider.      • Do not use illegal drugs.      •Exercise regularly. Ask your health care provider what type of exercise is safe for you.      •Try to get at least 7–9 hours of sleep each night, or as much as recommended by your health care provider.      •Find healthy ways to manage stress. Avoid stressful situations when possible.      Alcohol use   • Do not drink alcohol if:  •Your health care provider tells you not to drink.      •You are pregnant, may be pregnant, or are planning to become pregnant.      •Alcohol triggers your episodes.      •If you drink alcohol:•Limit how much you use to:  •0–1 drink a day for women.      •0–2 drinks a day for men.          •Be aware of how much alcohol is in your drink. In the [place] one drink equals one 12 oz bottle of beer (355 mL), one 5 oz glass of wine (148 mL), or one 1½ oz glass of hard liquor (44 mL).      General instructions     •Take over-the-counter and prescription medicines only as told by your health care provider.      •If caffeine triggers episodes of PVC, do not eat, drink, or use anything with caffeine in it.      •Keep all follow-up visits as told by your health care provider. This is important.        Contact a health care provider if you:    •Feel palpitations.        Get help right away if you:    •Have chest pain.      •Have shortness of breath.      •Have sweating for no reason.      •Have nausea and vomiting.      •Become light-headed or you faint.        Summary    •A premature ventricular contraction (PVC) is a common kind of irregular heartbeat (arrhythmia).      •In most cases, these contractions come and go and do not require treatment.      •You may need to wear an ambulatory cardiac monitor. This records your heartbeats for 24 hours or more.      •Treatment depends on any underlying conditions, the type of PVCs that you are having, and how much the symptoms are interfering with your daily life.      This information is not intended to replace advice given to you by your health care provider. Make sure you discuss any questions you have with your health care provider.

## 2022-12-11 NOTE — ED PROVIDER NOTE - PHYSICAL EXAMINATION
General: well appearing, NAD  Head: NC, AT  EENT: EOMI, no scleral icterus  Cardiac: tachycardic to the low 100s on monitor. no apparent murmurs, no lower extremity edema  Respiratory: CTABL, no respiratory distress   Abdomen: soft, ND, NT, nonperitonitic  MSK/Vascular: full ROM, soft compartments, warm extremities  Neuro: AAOx3, sensation to light touch intact  Psych: calm, cooperative

## 2022-12-11 NOTE — ED ADULT NURSE NOTE - OBJECTIVE STATEMENT
presents to ED with c/o palpitations and chest discomfort starting today, Hx of palpitations for the past two weeks, wearing Holter monitor

## 2022-12-11 NOTE — ED PROVIDER NOTE - ATTENDING CONTRIBUTION TO CARE
Patrick: I performed a face to face evaluation of this patient and performed a full history and physical examination on the patient.  I agree with the resident's history, physical examination, and plan of the patient unless otherwise noted. My brief assessment is as follows: hx hld, prediabtes, c/o several weeks chest discomfort/fluttering/sob. seen at [facility] <2 weeks ago for same. had slightly elevated dimer with neg cta chest, neg echo/stress test and trops. wearing holter monitor after following with cards and is following closely with cards. no f/c. denies hx dvt/pe, leg pain/swelling, cough, weight gain/loss, skin changes, temperature changes. no other complaints. non toxic, obese, frequent skipped beats otherwise bordline tachy, ctab, abd benign, no sewlling b/l LE. neuro intact, ekg peterson schemic with frequent pvcs. had neg dimer and cardiac w/u, has cards f/u. will check labs, close f/u may need EP outpt.

## 2022-12-12 PROBLEM — R73.03 PREDIABETES: Chronic | Status: ACTIVE | Noted: 2022-11-28

## 2023-01-06 NOTE — ED ADULT NURSE NOTE - CHPI ED NUR SYMPTOMS POS
Diabetes: Inspecting Your Feet      Diabetes increases your chances of developing foot problems. So inspect your feet every day. This helps you find small skin irritations before they become serious ulcers or infections. If you have trouble seeing the bottoms of your feet, use a mirror or ask a family member or friend to help.  How to check your feet  Below are tips to help you look for foot problems. Try to check your feet at the same time each day, such as when you get out of bed in the morning:  Check the top of each foot. The tops of toes, back of the heel, and outer edge of the foot can get a lot of rubbing from poor-fitting shoes.  Check the bottom of each foot. Daily wear and tear often leads to problems at pressure spots.  Check the toes and nails. Fungal infections often occur between toes. Toenail problems can also be a sign of fungal infections or lead to breaks in the skin.  Check your shoes, too. Loose objects inside a shoe can injure the foot. Use your hand to feel inside your shoes for things like elisabet, loose stitching, or rough areas that could irritate your skin.  Warning signs  Look for any color changes in the foot. Redness with streaks can signal a severe infection, which needs immediate medical attention. Tell your healthcare provider right away if you have any of these problems:  Swelling, sometimes with color changes, may be a sign of poor blood flow or infection. Symptoms include tenderness and an increase in the size of your foot.  Warm or hot areas on your feet may be signs of infection. A foot that is cold may not be getting enough blood.  Sensations such as burning, tingling, or pins and needles can be signs of a problem. Also check for areas that may be numb.  Hot spots are caused by friction or pressure. Look for hot spots in areas that get a lot of rubbing. Hot spots can turn into blisters, calluses, or sores.  Cracks and sores are caused by dry or irritated skin. They are a sign  that the skin is breaking down, which can lead to infection.  Toenail problems to watch for include nails growing into the skin (ingrown toenail) and causing redness or pain. Thick, yellow, or discolored nails can signal a fungal infection.  Drainage and odor can develop from untreated sores and ulcers. Call your healthcare provider right away if you notice white or yellow drainage, bleeding, or unpleasant odor.   Date Last Reviewed: 6/1/2016 © 2000-2017 [employer]. [address] [place] [id number]. All rights reserved. This information is not intended as a substitute for professional medical care. Always follow your healthcare professional's instructions.    Long-Term Complications of Diabetes    Diabetes can cause health problems over time. These are called complications. They are more likely to happen if your blood sugar is often too high. Over time, high blood sugar can damage blood vessels in your body. It is important to keep your blood sugar in your target range. This can help prevent or delay complications from diabetes.  Possible complications  Complications of diabetes include:    Eye problems, including damage to the blood vessels in the eyes (retinopathy), pressure in the eye (glaucoma), and clouding of the eye's lens (a cataract). Eye problems can eventually lead to irreversible blindness.   Tooth and gum problems (periodontal disease), causing loss of teeth and bone  Blood vessel (vascular) disease leading to circulation problems, heart attack or stroke, or a need for amputation of a limb   Problems with sexual function leading to erectile dysfunction in men and sexual discomfort in women   Kidney disease (nephropathy) can eventually lead to kidney failure, which may require dialysis or kidney transplant   Nerve problems (neuropathy), causing pain or loss of feeling in your feet and other parts of your body, potentially leading to an amputation of a limb   High blood pressure  (hypertension), putting strain on your heart and blood vessels  Serious infections, possibly leading to loss of toes, feet, or limbs    How to avoid complications  The serious consequences of these complications may be avoidable for most people with diabetes by managing your blood glucose, blood pressure, and cholesterol levels. This can help you feel better and stay healthy. You can manage diabetes by tracking your blood sugar. You can also eat healthy and exercise to avoid gaining weight. And you should take medicine if directed by your healthcare provider.     COUGH/WEAKNESS

## 2023-01-11 ENCOUNTER — TRANSCRIPTION ENCOUNTER (OUTPATIENT)
Age: 43
End: 2023-01-11

## 2023-09-21 NOTE — ED ADULT TRIAGE NOTE - WEIGHT IN KG
Type 2 Diabetes Management for Adults   WHAT YOU NEED TO KNOW:   What do I need to know about type 2 diabetes management? Type 2 diabetes is a disease that affects how your body uses glucose (sugar). Either your body cannot make enough insulin, or it cannot use the insulin correctly. It is important to keep diabetes controlled to prevent damage to your heart, blood vessels, and other organs. Management will help you feel well and enjoy your daily activities. Your diabetes care team providers can help you make a plan to fit diabetes care into your schedule. Your plan can change over time to fit your needs and your family's needs. What do I need to know about high blood sugar levels? High blood sugar levels may not cause any symptoms. You may feel more thirsty or urinate more often than usual. Over time, high blood sugar levels can damage your nerves, blood vessels, tissues, and organs. The following can increase your blood sugar levels:  Large meals or large amounts of carbohydrates at one time    Less physical activity    Stress    Illness    A lower dose of diabetes medicine or insulin, or a late dose    What do I need to know about low blood sugar levels? Symptoms include feeling shaky, dizzy, irritable, or confused. You can prevent symptoms by keeping your blood sugar levels from going too low. Treat a low blood sugar level right away:      Drink 4 ounces of juice or have 1 tube of glucose gel. Check your blood sugar level again 10 to 15 minutes later. When the level goes back to normal, eat a meal or snack to prevent another decrease. Keep glucose gel, raisins, or hard candy with you at all times to treat a low blood sugar level. Your blood sugar level can get too low if you take diabetes medicine or insulin and do not eat enough food. If you use insulin, check your blood sugar level before you exercise.       If your blood sugar level is below 100 mg/dL, eat 4 crackers or 2 ounces of raisins, or drink 4 ounces of juice. Check your level every 30 minutes if you exercise longer than 1 hour. You may need a snack during or after exercise. What can I do to manage my blood sugar levels? Check your blood sugar levels as directed and as needed. Several items are available to use to check your levels. You may need to check by testing a drop of blood in a glucose monitor. You may instead be given a continuous glucose monitoring (CGM) device. The device is worn at all times. The CGM checks your blood sugar level every 5 minutes. It sends results to an electronic device such as a smart phone. A CGM can be used with or without an insulin pump. You and your diabetes care team providers will decide on the best method for you. The goal for blood sugar levels before meals  is between 80 and 130 mg/dL and 2 hours after eating  is lower than 180 mg/dL. Make healthy food choices. Work with a dietitian to create a meal plan that works for you and your schedule. A dietitian can help you learn how to eat the right amount of carbohydrates (sugar and starchy foods) during your meals and snacks. Examples of carbohydrates are breads, cereals, rice, pasta, fruit, low-fat dairy, and sweets. Carbohydrates can raise your blood sugar level if you eat too many at one time. Eat high-fiber foods as directed. Fiber helps improve blood sugar levels. Fiber also lowers your risk for heart disease and other problems diabetes can cause. Examples of high-fiber foods include vegetables, whole-grain bread, and beans such as kimball beans. Your dietitian can tell you how much fiber to have each day. Get regular physical activity. Physical activity can help you get to your target blood sugar level goal and manage your weight. Get at least 150 minutes of moderate to vigorous aerobic physical activity each week. Resistance training, such as lifting weights, should be done 3 times each week.  Do not miss more than 2 days of physical activity in a row. Do not sit longer than 30 minutes at a time. Your healthcare provider can help you create an activity plan. The plan can include the best activities for you and can help you build your strength and endurance. Maintain a healthy weight. Ask your team what a healthy weight is for you. A healthy weight can help you control diabetes and prevent heart disease. Ask your team to help you create a weight-loss plan, if needed. Even a loss of 3% to 7% of your excess body weight can help make a difference in managing diabetes. Your team will help you set a weight-loss goal, such as 10 to 15 pounds, or 5% of your extra weight. Together you and your team can set manageable weight-loss goals. Take your diabetes medicine or insulin as directed. You may need diabetes medicine, insulin, or both to help control your blood sugar levels. Your provider will teach you how and when to take your diabetes medicine or insulin. You will also be taught about side effects oral diabetes medicine can cause. Insulin may be injected or given through a pump or pen. You and your providers will decide on the best method for you: An insulin pump  is an implanted device that gives your insulin 24 hours a day. An insulin pump prevents the need for multiple insulin injections in a day. An insulin pen  is a device prefilled with the right amount of insulin. You and your family members will be taught how to draw up and give insulin  if this is the best method for you. Your providers will also teach you how to dispose of needles and syringes. You will learn how much insulin you need  and when to give it. You will be taught when not to give insulin. You will also be taught what to do if your blood sugar level drops too low. This may happen if you take insulin and do not eat the right amount of carbohydrates. What else can I do to manage type 2 diabetes?    Wear medical alert identification. Wear medical alert jewelry or carry a card that says you have diabetes. Ask your care team provider where to get these items. Do not smoke. Nicotine and other chemicals in cigarettes and cigars can cause lung and blood vessel damage. It also makes it more difficult to manage your diabetes. Ask your provider for information if you currently smoke and need help to quit. Do not use e-cigarettes or smokeless tobacco in place of cigarettes or to help you quit. They still contain nicotine. Check your feet each day for cuts, scratches, calluses, or other wounds. Look for redness and swelling, and feel for warmth. Wear shoes that fit well. Check your shoes for rocks or other objects that can hurt your feet. Do not walk barefoot or wear shoes without socks. Wear cotton socks to help keep your feet dry. Ask about vaccines you may need. You have a higher risk for serious illness if you get the flu, pneumonia, COVID-19, or hepatitis. Ask your provider if you should get vaccines to prevent these or other diseases, and when to get the vaccines. Talk to your provider if you become stressed about diabetes care. Sometimes being able to fit diabetes care into your life can cause increased stress. The stress can cause you not to take care of yourself properly. Your provider can help by offering tips about self-care. A mental health provider can listen and offer help with self-care issues. Other types of counseling can help you make nutrition or physical activity changes. Have your A1c checked as directed. Your provider may check your A1c every 3 months, or 2 times each year if your diabetes is controlled. An A1c test shows the average amount of sugar in your blood over the past 2 to 3 months. Your provider will tell you what your A1c level should be. Have screening tests as directed.   Your provider may recommend screening for complications of diabetes and other conditions that may develop. Some screenings may begin right away and some may happen within the first 5 years of diagnosis:    Examples of diabetes complications  include kidney problems, high cholesterol, high blood pressure, blood vessel problems, eye problems, and sleep apnea. You may be screened for a low vitamin B level  if you take oral diabetes medicine for a long time. You may be screened for polycystic ovarian syndrome (PCOS)  if you are of childbearing age. Have someone call your local emergency number (911 in the [address]) if:   You cannot be woken. You have signs of diabetic ketoacidosis:     confusion, fatigue    vomiting    rapid heartbeat    fruity smelling breath    extreme thirst    dry mouth and skin    You have any of the following signs of a heart attack:      Squeezing, pressure, or pain in your chest    You may  also have any of the following:     Discomfort or pain in your back, neck, jaw, stomach, or arm    Shortness of breath    Nausea or vomiting    Lightheadedness or a sudden cold sweat    You have any of the following signs of a stroke:      Numbness or drooping on one side of your face     Weakness in an arm or leg    Confusion or difficulty speaking    Dizziness, a severe headache, or vision loss    When should I call my doctor or diabetes care team provider? You have a sore or wound that will not heal.    You have a change in the amount you urinate. Your blood sugar levels are higher than your target goals. You often have lower blood sugar levels than your target goals. Your skin is red, dry, warm, or swollen. You have trouble coping with diabetes, or you feel anxious or depressed. You have trouble following any part of your care plan, such as your meal plan. You have questions or concerns about your condition or care. CARE AGREEMENT:   You have the right to help plan your care. Learn about your health condition and how it may be treated.  Discuss treatment options with your healthcare providers to decide what care you want to receive. You always have the right to refuse treatment. The above information is an  only. It is not intended as medical advice for individual conditions or treatments. Talk to your doctor, nurse or pharmacist before following any medical regimen to see if it is safe and effective for you. © Copyright [employer] 2023 Information is for End User's use only and may not be sold, redistributed or otherwise used for commercial purposes. 136.1

## 2024-03-14 ENCOUNTER — EMERGENCY (EMERGENCY)
Facility: HOSPITAL | Age: 44
LOS: 1 days | Discharge: HOME | End: 2024-03-14
Attending: EMERGENCY MEDICINE
Payer: COMMERCIAL

## 2024-03-14 VITALS
SYSTOLIC BLOOD PRESSURE: 137 MMHG | HEART RATE: 51 BPM | WEIGHT: 179.9 LBS | OXYGEN SATURATION: 99 % | TEMPERATURE: 98 F | RESPIRATION RATE: 18 BRPM | DIASTOLIC BLOOD PRESSURE: 86 MMHG

## 2024-03-14 DIAGNOSIS — E78.5 HYPERLIPIDEMIA, UNSPECIFIED: ICD-10-CM

## 2024-03-14 DIAGNOSIS — I49.3 VENTRICULAR PREMATURE DEPOLARIZATION: ICD-10-CM

## 2024-03-14 DIAGNOSIS — I10 ESSENTIAL (PRIMARY) HYPERTENSION: ICD-10-CM

## 2024-03-14 LAB
ALBUMIN SERPL ELPH-MCNC: 4.3 G/DL — SIGNIFICANT CHANGE UP (ref 3.3–5.2)
ALP SERPL-CCNC: 61 U/L — SIGNIFICANT CHANGE UP (ref 40–120)
ALT FLD-CCNC: 54 U/L — HIGH
ANION GAP SERPL CALC-SCNC: 12 MMOL/L — SIGNIFICANT CHANGE UP (ref 5–17)
AST SERPL-CCNC: 54 U/L — HIGH
BASOPHILS # BLD AUTO: 0.03 K/UL — SIGNIFICANT CHANGE UP (ref 0–0.2)
BASOPHILS NFR BLD AUTO: 0.6 % — SIGNIFICANT CHANGE UP (ref 0–2)
BILIRUB SERPL-MCNC: 0.3 MG/DL — LOW (ref 0.4–2)
BUN SERPL-MCNC: 9.9 MG/DL — SIGNIFICANT CHANGE UP (ref 8–20)
CALCIUM SERPL-MCNC: 9.6 MG/DL — SIGNIFICANT CHANGE UP (ref 8.4–10.5)
CHLORIDE SERPL-SCNC: 98 MMOL/L — SIGNIFICANT CHANGE UP (ref 96–108)
CO2 SERPL-SCNC: 27 MMOL/L — SIGNIFICANT CHANGE UP (ref 22–29)
CREAT SERPL-MCNC: 0.97 MG/DL — SIGNIFICANT CHANGE UP (ref 0.5–1.3)
D DIMER BLD IA.RAPID-MCNC: 248 NG/ML DDU — HIGH
EGFR: 99 ML/MIN/1.73M2 — SIGNIFICANT CHANGE UP
EOSINOPHIL # BLD AUTO: 0.14 K/UL — SIGNIFICANT CHANGE UP (ref 0–0.5)
EOSINOPHIL NFR BLD AUTO: 2.7 % — SIGNIFICANT CHANGE UP (ref 0–6)
GLUCOSE SERPL-MCNC: 170 MG/DL — HIGH (ref 70–99)
HCT VFR BLD CALC: 39.9 % — SIGNIFICANT CHANGE UP (ref 39–50)
HGB BLD-MCNC: 13.9 G/DL — SIGNIFICANT CHANGE UP (ref 13–17)
IMM GRANULOCYTES NFR BLD AUTO: 0.2 % — SIGNIFICANT CHANGE UP (ref 0–0.9)
LYMPHOCYTES # BLD AUTO: 1.39 K/UL — SIGNIFICANT CHANGE UP (ref 1–3.3)
LYMPHOCYTES # BLD AUTO: 26.7 % — SIGNIFICANT CHANGE UP (ref 13–44)
MAGNESIUM SERPL-MCNC: 1.8 MG/DL — SIGNIFICANT CHANGE UP (ref 1.6–2.6)
MCHC RBC-ENTMCNC: 27.5 PG — SIGNIFICANT CHANGE UP (ref 27–34)
MCHC RBC-ENTMCNC: 34.8 GM/DL — SIGNIFICANT CHANGE UP (ref 32–36)
MCV RBC AUTO: 78.9 FL — LOW (ref 80–100)
MONOCYTES # BLD AUTO: 0.47 K/UL — SIGNIFICANT CHANGE UP (ref 0–0.9)
MONOCYTES NFR BLD AUTO: 9 % — SIGNIFICANT CHANGE UP (ref 2–14)
NEUTROPHILS # BLD AUTO: 3.16 K/UL — SIGNIFICANT CHANGE UP (ref 1.8–7.4)
NEUTROPHILS NFR BLD AUTO: 60.8 % — SIGNIFICANT CHANGE UP (ref 43–77)
NT-PROBNP SERPL-SCNC: <36 PG/ML — SIGNIFICANT CHANGE UP (ref 0–300)
PLATELET # BLD AUTO: 213 K/UL — SIGNIFICANT CHANGE UP (ref 150–400)
POTASSIUM SERPL-MCNC: 3.8 MMOL/L — SIGNIFICANT CHANGE UP (ref 3.5–5.3)
POTASSIUM SERPL-SCNC: 3.8 MMOL/L — SIGNIFICANT CHANGE UP (ref 3.5–5.3)
PROT SERPL-MCNC: 6.9 G/DL — SIGNIFICANT CHANGE UP (ref 6.6–8.7)
RBC # BLD: 5.06 M/UL — SIGNIFICANT CHANGE UP (ref 4.2–5.8)
RBC # FLD: 12.3 % — SIGNIFICANT CHANGE UP (ref 10.3–14.5)
SODIUM SERPL-SCNC: 137 MMOL/L — SIGNIFICANT CHANGE UP (ref 135–145)
TROPONIN T, HIGH SENSITIVITY RESULT: 9 NG/L — SIGNIFICANT CHANGE UP (ref 0–51)
TROPONIN T, HIGH SENSITIVITY RESULT: 9 NG/L — SIGNIFICANT CHANGE UP (ref 0–51)
TSH SERPL-MCNC: 1.25 UIU/ML — SIGNIFICANT CHANGE UP (ref 0.27–4.2)
WBC # BLD: 5.2 K/UL — SIGNIFICANT CHANGE UP (ref 3.8–10.5)
WBC # FLD AUTO: 5.2 K/UL — SIGNIFICANT CHANGE UP (ref 3.8–10.5)

## 2024-03-14 PROCEDURE — 71045 X-RAY EXAM CHEST 1 VIEW: CPT | Mod: 26

## 2024-03-14 PROCEDURE — 99223 1ST HOSP IP/OBS HIGH 75: CPT

## 2024-03-14 PROCEDURE — 93010 ELECTROCARDIOGRAM REPORT: CPT

## 2024-03-14 RX ORDER — DILTIAZEM HCL 120 MG
120 CAPSULE, EXT RELEASE 24 HR ORAL DAILY
Refills: 0 | Status: DISCONTINUED | OUTPATIENT
Start: 2024-03-14 | End: 2024-03-14

## 2024-03-14 RX ORDER — MAGNESIUM SULFATE 500 MG/ML
2 VIAL (ML) INJECTION ONCE
Refills: 0 | Status: COMPLETED | OUTPATIENT
Start: 2024-03-14 | End: 2024-03-14

## 2024-03-14 RX ORDER — ATORVASTATIN CALCIUM 80 MG/1
80 TABLET, FILM COATED ORAL AT BEDTIME
Refills: 0 | Status: DISCONTINUED | OUTPATIENT
Start: 2024-03-14 | End: 2024-03-22

## 2024-03-14 RX ORDER — DILTIAZEM HCL 120 MG
240 CAPSULE, EXT RELEASE 24 HR ORAL DAILY
Refills: 0 | Status: DISCONTINUED | OUTPATIENT
Start: 2024-03-14 | End: 2024-03-22

## 2024-03-14 RX ADMIN — Medication 25 GRAM(S): at 19:33

## 2024-03-14 NOTE — ED CDU PROVIDER INITIAL DAY NOTE - OBJECTIVE STATEMENT
Attending notified regarding vitals. Tylenol suppository administered and new orders received.   44 y/o male with pmhxx HTN, HLD, PVCs on Cardizem presents to ED for palpitations, chest pain, shortness of breath. Patient reports he has a smart watch which had picked up his increased PVCs and inc HR. He has seen cardio outpatient in the past, who yesterday recommended changing his Cardizem dose. Patient reports he has cough weeks ago, now improved with no cough/fever/chills/congestion. No back pain, no nausea/vomiting, no dysuria, no headache.

## 2024-03-14 NOTE — ED PROVIDER NOTE - OBJECTIVE STATEMENT
43-year-old male with history of HTN, HLD, PVCs on Cardizem presenting with sudden onset nonexertional palpitations, shortness of breath, chest pain that lasted for 5 minutes.  Patient states that the symptoms have been going on for the past month and are intermittent.  Patient follows with general cardiology and electrophysiology with previous Holter monitor with unremarkable findings.  On ED arrival patient denies active chest pain, shortness of breath, palpitations.  Patient states with symptom onset his Apple Watch alerts him of increased heart rate and PVCs. Denies fevers, chills, headache, cough, nausea, vomiting, diarrhea, hematuria, dysuria, dark stools, focal neurologic symptoms.

## 2024-03-14 NOTE — ED CDU PROVIDER INITIAL DAY NOTE - CLINICAL SUMMARY MEDICAL DECISION MAKING FREE TEXT BOX
42 y/o male with pmhx HTN, HLD, PVCs on Cardizem presents to ED for palpitations, chest pain, shortness of breath. While in ED, labs revealed no leukocytosis, trop rpt neg delta change, U- waves noted on ECG, magnesium given. CXR. Patient in Observation for cardiology consult and tele monitoring.

## 2024-03-14 NOTE — ED PROVIDER NOTE - CLINICAL SUMMARY MEDICAL DECISION MAKING FREE TEXT BOX
43-year-old male with nonexertional sudden onset palpitations, dyspnea, chest pain in the setting of known PVCs on Cardizem and hypertension.  Hemodynamically stable with no tachycardia, saturating well on room air with clear lungs bilaterally, no lower extremity swelling.  Differential includes ACS, electrolyte derangement, arrhythmia. 43-year-old male with nonexertional sudden onset palpitations, dyspnea, chest pain in the setting of known PVCs on Cardizem and hypertension.  Hemodynamically stable with no tachycardia, saturating well on room air with clear lungs bilaterally, no lower extremity swelling.  Differential includes ACS, electrolyte derangement, arrhythmia.  Labs and imaging independently reviewed and interpreted.  Cardiology following plan for observation unit.

## 2024-03-14 NOTE — CONSULT NOTE ADULT - SUBJECTIVE AND OBJECTIVE BOX
[facility]                                              CARDIOLOGY AT [address], [place] [address]                                             Telephone: [phone]. Fax:[phone]      CARDIOLOGY CONSULTATION NOTE                                                                                             History obtained by: Patient and medical record  Community Cardiologist: Cardiology Dr Craft, EP Dr Wren   obtained: No   Reason for Consultation: Chest pain    Chief complaint:   Patient is a 43y old  Male who presents with a chief complaint of   HPI: Obese     CARDIAC TESTING   TTE Echo Complete w/o Contrast w/ Doppler (11.28.22 @ 11:45) >   Summary   The left ventricle is normal in size, wall thickness, wall motion and   contractility. Estimated left ventricular ejection fraction is 55 %.   Normal appearing right ventricle structure and function.   Minimal mitral regurgitation.   Minimal tricuspid valve regurgitation.  < end of copied text >    NM Nuclear Stress Pharmacologic Multiple (11.29.22 @ 10:09) >  IMPRESSION: Normal SPECT Myocardial Perfusion Imaging.  < end of copied text >    CT Angio Chest PE Protocol w/ IV Cont (11.27.22 @ 21:13) >  FINDINGS:  LUNGS AND AIRWAYS: Patent central airways.  No consolidation. Punctate   right lower lobe calcified granuloma.  PLEURA: No pleural effusion.  MEDIASTINUM AND JOYCE: No lymphadenopathy.  VESSELS: Within normal limits. No pulmonary embolism.  HEART: Heart size is normal. No pericardial effusion.  CHEST WALL AND LOWER NECK: Within normal limits.  VISUALIZED UPPER ABDOMEN: Hepatic steatosis  BONES: Within normal limits.  IMPRESSION:  No pulmonary embolism.  < end of copied text >    PAST MEDICAL HISTORY  HTN - Hypertension  Chest Mass  Prediabetes  HTN (hypertension)  HLD (hyperlipidemia)    PAST SURGICAL HISTORY  Denies    SOCIAL HISTORY: Denies smoking/alcohol/drugs    FAMILY HISTORY:  FH: HTN (hypertension) (Father)  FHx: hypertension (Sibling)    Family History of Cardiovascular Disease:  No   Coronary Artery Disease in first degree relative: No   Sudden Cardiac Death in First degree relative:  No     HOME MEDICATIONS:   Cardizem 120 mg OD (today pt called PCP and was told to take it BID)  HCTZ 25 mg OD  rosuvastatin 20 mg OD    ALLERGIES:   No Known Drug Allergies  shellfish (Unknown)  eggs (Unknown)    REVIEW OF SYMPTOMS:   CONSTITUTIONAL: No fever, no chills, no weight loss, no weight gain, no fatigue   ENMT:  No vertigo; No sinus or throat pain  NECK: No pain or stiffness  CARDIOVASCULAR: + chest pain (improved 4/10), no SOB (resolved), no syncope/presyncope, no fatigue, no palpitations (resolved), no dizziness, no Orthopnea, no Paroxsymal nocturnal dyspnea  RESPIRATORY:  no cough  : No dysuria, no hematuria   GI: no nausea, no diarrhea, no constipation, no abdominal pain  NEURO: No headache, no slurred speech   MUSCULOSKELETAL: No joint pain or swelling; No muscle, back, or extremity pain  PSYCH: No agitation, no anxiety.    ALL OTHER REVIEW OF SYSTEMS ARE NEGATIVE.    VITAL SIGNS:   T(C): 36.8 (03-14-24 @ 19:13), Max: 36.8 (03-14-24 @ 19:13)  T(F): 98.2 (03-14-24 @ 19:13), Max: 98.2 (03-14-24 @ 19:13)  HR: 73 (03-14-24 @ 19:13) (51 - 73)  BP: 129/83 (03-14-24 @ 19:13) (129/83 - 137/86)  RR: 18 (03-14-24 @ 19:13) (18 - 18)  SpO2: 98% (03-14-24 @ 19:13) (98% - 99%)    PHYSICAL EXAM:   Constitutional: Comfortable . No acute distress.   HEENT: Atraumatic and normocephalic , neck is supple . no JVD.   CNS: A&Ox3. No focal deficits.   Respiratory: CTAB, unlabored. No wheezing, No rhonchi. No crackles   Cardiovascular: RRR normal s1 s2. No murmur. No rubs or gallop.  Gastrointestinal: Soft, non-tender. +Bowel sounds.   Extremities: 2+ Peripheral Pulses, No edema  Psychiatric: Calm . no agitation.   Skin: Warm and dry    LABS:                         13.9   5.20  )-----------( 213      ( 14 Mar 2024 17:15 )             39.9     03-14    137  |  98  |  9.9  ----------------------------<  170<H>  3.8   |  27.0  |  0.97    Ca    9.6      14 Mar 2024 17:15  Mg     1.8     03-14    TPro  6.9  /  Alb  4.3  /  TBili  0.3<L>  /  DBili  x   /  AST  54<H>  /  ALT  54<H>  /  AlkPhos  61  03-14      Thyroid Stimulating Hormone, Serum: 1.25 uIU/mL (03-14-24 @ 17:15)      ECG:   Prior ECG: Yes     RADIOLOGY & ADDITIONAL STUDIES:   Xray Chest 1 View- PORTABLE-Urgent (03.14.24 @ 17:27) >  IMPRESSION: Negative chest.  < end of copied text >      Preliminary evaluation, please await official recommendations     Assessment and recommendations are final when note is signed by the attending.                                      [facility]                                              CARDIOLOGY AT [facility]                                                   [address] [facility], [place] [address]                                             Telephone: [phone]. Fax:[phone]      CARDIOLOGY CONSULTATION NOTE                                                                                             History obtained by: Patient and medical record  Community Cardiologist: Cardiology Dr Craft, EP Dr Wren   obtained: No   Reason for Consultation: Chest pain    Chief complaint:   Patient is a 43y old  Male who presents with a chief complaint of frequent PVCs.    HPI: 43-year-old obese male with PMHx of HTN, HLD, PVCs (on Cardizem), pre-DM, and ?chest mass presented co frequent PVCs associated with chest pain, palpitations, SOB and diaphoresis. Pt is having these symptoms for 4-6 weeks intermittently. Last episode was today around 3:00 PM and lasted 20 minutes. Chest pain/pressure radiates to the back, 6/10. Usually symptoms happen with exertion but can also happen at rest.  Patient follows with general cardiology and electrophysiology with previous Holter monitor, stress test, CTA chest, and echo with unremarkable findings.  Patient states with symptom onset his Apple Watch alerts him of increased heart rate and PVCs. Denies syncope, dizziness, fevers, chills, headache, cough, nausea, vomiting, diarrhea, hematuria, dysuria, dark stools, focal neurologic symptoms.    CARDIAC TESTING   TTE Echo Complete w/o Contrast w/ Doppler (11.28.22 @ 11:45) >   Summary   The left ventricle is normal in size, wall thickness, wall motion and   contractility. Estimated left ventricular ejection fraction is 55 %.   Normal appearing right ventricle structure and function.   Minimal mitral regurgitation.   Minimal tricuspid valve regurgitation.  < end of copied text >    NM Nuclear Stress Pharmacologic Multiple (11.29.22 @ 10:09) >  IMPRESSION: Normal SPECT Myocardial Perfusion Imaging.  < end of copied text >    CT Angio Chest PE Protocol w/ IV Cont (11.27.22 @ 21:13) >  FINDINGS:  LUNGS AND AIRWAYS: Patent central airways.  No consolidation. Punctate   right lower lobe calcified granuloma.  PLEURA: No pleural effusion.  MEDIASTINUM AND JOYCE: No lymphadenopathy.  VESSELS: Within normal limits. No pulmonary embolism.  HEART: Heart size is normal. No pericardial effusion.  CHEST WALL AND LOWER NECK: Within normal limits.  VISUALIZED UPPER ABDOMEN: Hepatic steatosis  BONES: Within normal limits.  IMPRESSION:  No pulmonary embolism.  < end of copied text >    PAST MEDICAL HISTORY  HTN - Hypertension  Chest Mass  Prediabetes  HTN (hypertension)  HLD (hyperlipidemia)    PAST SURGICAL HISTORY  Denies    SOCIAL HISTORY: Denies smoking/alcohol/drugs    FAMILY HISTORY:  FH: HTN (hypertension) (Father)  FHx: hypertension (Sibling)    Family History of Cardiovascular Disease:  No   Coronary Artery Disease in first degree relative: No   Sudden Cardiac Death in First degree relative:  No     HOME MEDICATIONS:   Cardizem 120 mg OD (today pt called PCP and was told to take it BID)  HCTZ 25 mg OD  rosuvastatin 20 mg OD    ALLERGIES:   No Known Drug Allergies  shellfish (Unknown)  eggs (Unknown)    REVIEW OF SYMPTOMS:   CONSTITUTIONAL: No fever, no chills, no weight loss, no weight gain, no fatigue   ENMT:  No vertigo; No sinus or throat pain  NECK: No pain or stiffness  CARDIOVASCULAR: + chest pain (improved 4/10), no SOB (resolved), no syncope/presyncope, no fatigue, no palpitations (resolved), no dizziness, no Orthopnea, no Paroxsymal nocturnal dyspnea  RESPIRATORY:  no cough  : No dysuria, no hematuria   GI: no nausea, no diarrhea, no constipation, no abdominal pain  NEURO: No headache, no slurred speech   MUSCULOSKELETAL: No joint pain or swelling; No muscle, back, or extremity pain  PSYCH: No agitation, no anxiety.    ALL OTHER REVIEW OF SYSTEMS ARE NEGATIVE.    VITAL SIGNS:   T(C): 36.8 (03-14-24 @ 19:13), Max: 36.8 (03-14-24 @ 19:13)  T(F): 98.2 (03-14-24 @ 19:13), Max: 98.2 (03-14-24 @ 19:13)  HR: 73 (03-14-24 @ 19:13) (51 - 73)  BP: 129/83 (03-14-24 @ 19:13) (129/83 - 137/86)  RR: 18 (03-14-24 @ 19:13) (18 - 18)  SpO2: 98% (03-14-24 @ 19:13) (98% - 99%)    PHYSICAL EXAM:   Constitutional: Comfortable . No acute distress.   HEENT: Atraumatic and normocephalic , neck is supple . no JVD.   CNS: A&Ox3. No focal deficits.   Respiratory: CTAB, unlabored. No wheezing, No rhonchi. No crackles   Cardiovascular: RRR normal s1 s2. No murmur. No rubs or gallop.  Gastrointestinal: Soft, non-tender. +Bowel sounds.   Extremities: 2+ Peripheral Pulses, No edema  Psychiatric: Calm . no agitation.   Skin: Warm and dry    LABS:                         13.9   5.20  )-----------( 213      ( 14 Mar 2024 17:15 )             39.9     03-14    137  |  98  |  9.9  ----------------------------<  170<H>  3.8   |  27.0  |  0.97    Ca    9.6      14 Mar 2024 17:15  Mg     1.8     03-14    TPro  6.9  /  Alb  4.3  /  TBili  0.3<L>  /  DBili  x   /  AST  54<H>  /  ALT  54<H>  /  AlkPhos  61  03-14      Thyroid Stimulating Hormone, Serum: 1.25 uIU/mL (03-14-24 @ 17:15)      ECG:   Prior ECG: Yes     RADIOLOGY & ADDITIONAL STUDIES:   Xray Chest 1 View- PORTABLE-Urgent (03.14.24 @ 17:27) >  IMPRESSION: Negative chest.  < end of copied text >      Preliminary evaluation, please await official recommendations     Assessment and recommendations are final when note is signed by the attending.                                      [facility]                                              CARDIOLOGY AT [facility]                                                   [address], [place] [address]                                             Telephone: [phone]. Fax:[phone]      CARDIOLOGY CONSULTATION NOTE                                                                                             History obtained by: Patient and medical record  Community Cardiologist: Cardiology Dr Craft, EP Dr Wren   obtained: No   Reason for Consultation: Chest pain    Chief complaint:   Patient is a 43y old  Male who presents with a chief complaint of frequent PVCs.    HPI: 43-year-old obese male with PMHx of HTN, HLD, PVCs (on Cardizem), pre-DM, and ?chest mass presented co frequent PVCs associated with chest pain, palpitations, SOB and diaphoresis. Pt is having these symptoms for 4-6 weeks intermittently. Last episode was today around 3:00 PM and lasted 20 minutes. Chest pain/pressure radiates to the back, 6/10. Usually symptoms happen with exertion but can also happen at rest.  Patient follows with general cardiology and electrophysiology with previous Holter monitor, stress test, CTA chest, and echo with unremarkable findings.  Patient states with symptom onset his Apple Watch alerts him of increased heart rate and PVCs. Denies syncope, dizziness, fevers, chills, headache, cough, nausea, vomiting, diarrhea, hematuria, dysuria, dark stools, focal neurologic symptoms.    CARDIAC TESTING (No records in Allscript)  TTE Echo Complete w/o Contrast w/ Doppler (11.28.22 @ 11:45) >   Summary   The left ventricle is normal in size, wall thickness, wall motion and   contractility. Estimated left ventricular ejection fraction is 55 %.   Normal appearing right ventricle structure and function.   Minimal mitral regurgitation.   Minimal tricuspid valve regurgitation.  < end of copied text >    NM Nuclear Stress Pharmacologic Multiple (11.29.22 @ 10:09) >  IMPRESSION: Normal SPECT Myocardial Perfusion Imaging.  < end of copied text >    CT Angio Chest PE Protocol w/ IV Cont (11.27.22 @ 21:13) >  FINDINGS:  LUNGS AND AIRWAYS: Patent central airways.  No consolidation. Punctate   right lower lobe calcified granuloma.  PLEURA: No pleural effusion.  MEDIASTINUM AND JOYCE: No lymphadenopathy.  VESSELS: Within normal limits. No pulmonary embolism.  HEART: Heart size is normal. No pericardial effusion.  CHEST WALL AND LOWER NECK: Within normal limits.  VISUALIZED UPPER ABDOMEN: Hepatic steatosis  BONES: Within normal limits.  IMPRESSION:  No pulmonary embolism.  < end of copied text >    PAST MEDICAL HISTORY  HTN - Hypertension  Chest Mass  Prediabetes  HTN (hypertension)  HLD (hyperlipidemia)    PAST SURGICAL HISTORY  Denies    SOCIAL HISTORY: Denies smoking/alcohol/drugs    FAMILY HISTORY:  FH: HTN (hypertension) (Father)  FHx: hypertension (Sibling)    Family History of Cardiovascular Disease:  No   Coronary Artery Disease in first degree relative: No   Sudden Cardiac Death in First degree relative:  No     HOME MEDICATIONS:   Cardizem 120 mg OD (today pt called PCP and was told to take it BID)  HCTZ 25 mg OD  rosuvastatin 20 mg OD    ALLERGIES:   No Known Drug Allergies  shellfish (Unknown)  eggs (Unknown)    REVIEW OF SYMPTOMS:   CONSTITUTIONAL: No fever, no chills, no weight loss, no weight gain, no fatigue   ENMT:  No vertigo; No sinus or throat pain  NECK: No pain or stiffness  CARDIOVASCULAR: + chest pain (improved 4/10), no SOB (resolved), no syncope/presyncope, no fatigue, no palpitations (resolved), no dizziness, no Orthopnea, no Paroxsymal nocturnal dyspnea  RESPIRATORY:  no cough  : No dysuria, no hematuria   GI: no nausea, no diarrhea, no constipation, no abdominal pain  NEURO: No headache, no slurred speech   MUSCULOSKELETAL: No joint pain or swelling; No muscle, back, or extremity pain  PSYCH: No agitation, no anxiety.    ALL OTHER REVIEW OF SYSTEMS ARE NEGATIVE.    VITAL SIGNS:   T(C): 36.8 (03-14-24 @ 19:13), Max: 36.8 (03-14-24 @ 19:13)  T(F): 98.2 (03-14-24 @ 19:13), Max: 98.2 (03-14-24 @ 19:13)  HR: 73 (03-14-24 @ 19:13) (51 - 73)  BP: 129/83 (03-14-24 @ 19:13) (129/83 - 137/86)  RR: 18 (03-14-24 @ 19:13) (18 - 18)  SpO2: 98% (03-14-24 @ 19:13) (98% - 99%)    PHYSICAL EXAM:   Constitutional: Comfortable . No acute distress.   HEENT: Atraumatic and normocephalic , neck is supple . no JVD.   CNS: A&Ox3. No focal deficits.   Respiratory: CTAB, unlabored. No wheezing, No rhonchi. No crackles   Cardiovascular: RRR normal s1 s2. No murmur. No rubs or gallop.  Gastrointestinal: Soft, non-tender. +Bowel sounds.   Extremities: 2+ Peripheral Pulses, No edema  Psychiatric: Calm . no agitation.   Skin: Warm and dry    LABS:                         13.9   5.20  )-----------( 213      ( 14 Mar 2024 17:15 )             39.9     03-14    137  |  98  |  9.9  ----------------------------<  170<H>  3.8   |  27.0  |  0.97    Ca    9.6      14 Mar 2024 17:15  Mg     1.8     03-14    TPro  6.9  /  Alb  4.3  /  TBili  0.3<L>  /  DBili  x   /  AST  54<H>  /  ALT  54<H>  /  AlkPhos  61  03-14      Thyroid Stimulating Hormone, Serum: 1.25 uIU/mL (03-14-24 @ 17:15)      ECG:   Prior ECG: Yes     RADIOLOGY & ADDITIONAL STUDIES:   Xray Chest 1 View- PORTABLE-Urgent (03.14.24 @ 17:27) >  IMPRESSION: Negative chest.  < end of copied text >      Preliminary evaluation, please await official recommendations     Assessment and recommendations are final when note is signed by the attending.                                      [facility]                                              CARDIOLOGY AT [facility]                                                   [address] [facility], [place] [address]                                             Telephone: [phone]. Fax:[phone]      CARDIOLOGY CONSULTATION NOTE                                                                                             History obtained by: Patient and medical record  Community Cardiologist: Cardiology Dr Craft, EP Dr Wren   obtained: No   Reason for Consultation: Chest pain    Chief complaint:   Patient is a 43y old  Male who presents with a chief complaint of frequent PVCs.    HPI: 43-year-old obese male with PMHx of HTN, HLD, PVCs (on Cardizem), pre-DM, and ?chest mass presented co frequent PVCs associated with chest pain, palpitations, SOB and diaphoresis. Pt is having these symptoms for 4-6 weeks intermittently. Last episode was today around 3:00 PM and lasted 20 minutes. Chest pain/pressure radiates to the back, 6/10. Usually symptoms happen with exertion but can also happen at rest.  Patient follows with general cardiology and electrophysiology with previous Holter monitor, stress test, CTA chest, and echo with unremarkable findings.  Patient states with symptom onset his Apple Watch alerts him of increased heart rate and PVCs. Denies syncope, dizziness, fevers, chills, headache, cough, nausea, vomiting, diarrhea, hematuria, dysuria, dark stools, focal neurologic symptoms.    CARDIAC TESTING (No records in Allscript)  TTE Echo Complete w/o Contrast w/ Doppler (11.28.22 @ 11:45) >   Summary   The left ventricle is normal in size, wall thickness, wall motion and   contractility. Estimated left ventricular ejection fraction is 55 %.   Normal appearing right ventricle structure and function.   Minimal mitral regurgitation.   Minimal tricuspid valve regurgitation.  < end of copied text >    NM Nuclear Stress Pharmacologic Multiple (11.29.22 @ 10:09) >  IMPRESSION: Normal SPECT Myocardial Perfusion Imaging.  < end of copied text >    CT Angio Chest PE Protocol w/ IV Cont (11.27.22 @ 21:13) >  FINDINGS:  LUNGS AND AIRWAYS: Patent central airways.  No consolidation. Punctate   right lower lobe calcified granuloma.  PLEURA: No pleural effusion.  MEDIASTINUM AND JOYCE: No lymphadenopathy.  VESSELS: Within normal limits. No pulmonary embolism.  HEART: Heart size is normal. No pericardial effusion.  CHEST WALL AND LOWER NECK: Within normal limits.  VISUALIZED UPPER ABDOMEN: Hepatic steatosis  BONES: Within normal limits.  IMPRESSION:  No pulmonary embolism.  < end of copied text >    PAST MEDICAL HISTORY  HTN - Hypertension  Chest Mass  Prediabetes  HTN (hypertension)  HLD (hyperlipidemia)    PAST SURGICAL HISTORY  Denies    SOCIAL HISTORY: Denies smoking/alcohol/drugs    FAMILY HISTORY:  FH: HTN (hypertension) (Father)  FHx: hypertension (Sibling)    Family History of Cardiovascular Disease:  No   Coronary Artery Disease in first degree relative: No   Sudden Cardiac Death in First degree relative:  No     HOME MEDICATIONS:   Cardizem 120 mg OD (today pt called PCP and was told to take it BID)  HCTZ 25 mg OD  rosuvastatin 20 mg OD    ALLERGIES:   No Known Drug Allergies  shellfish (Unknown)  eggs (Unknown)    REVIEW OF SYMPTOMS:   CONSTITUTIONAL: No fever, no chills, no weight loss, no weight gain, no fatigue   ENMT:  No vertigo; No sinus or throat pain  NECK: No pain or stiffness  CARDIOVASCULAR: + chest pain (improved 4/10), no SOB (resolved), no syncope/presyncope, no fatigue, no palpitations (resolved), no dizziness, no Orthopnea, no Paroxsymal nocturnal dyspnea  RESPIRATORY:  no cough  : No dysuria, no hematuria   GI: no nausea, no diarrhea, no constipation, no abdominal pain  NEURO: No headache, no slurred speech   MUSCULOSKELETAL: No joint pain or swelling; No muscle, back, or extremity pain  PSYCH: No agitation, no anxiety.    ALL OTHER REVIEW OF SYSTEMS ARE NEGATIVE.    VITAL SIGNS:   T(C): 36.8 (03-14-24 @ 19:13), Max: 36.8 (03-14-24 @ 19:13)  T(F): 98.2 (03-14-24 @ 19:13), Max: 98.2 (03-14-24 @ 19:13)  HR: 73 (03-14-24 @ 19:13) (51 - 73)  BP: 129/83 (03-14-24 @ 19:13) (129/83 - 137/86)  RR: 18 (03-14-24 @ 19:13) (18 - 18)  SpO2: 98% (03-14-24 @ 19:13) (98% - 99%)    PHYSICAL EXAM:   Constitutional: Comfortable . No acute distress.   HEENT: Atraumatic and normocephalic , neck is supple . no JVD.   CNS: A&Ox3. No focal deficits.   Respiratory: CTAB, unlabored. No wheezing, No rhonchi. No crackles   Cardiovascular: RRR normal s1 s2. No murmur. No rubs or gallop.  Gastrointestinal: Soft, non-tender. +Bowel sounds.   Extremities: 2+ Peripheral Pulses, No edema  Psychiatric: Calm . no agitation.   Skin: Warm and dry    LABS:                         13.9   5.20  )-----------( 213      ( 14 Mar 2024 17:15 )             39.9     03-14    137  |  98  |  9.9  ----------------------------<  170<H>  3.8   |  27.0  |  0.97    Ca    9.6      14 Mar 2024 17:15  Mg     1.8     03-14    TPro  6.9  /  Alb  4.3  /  TBili  0.3<L>  /  DBili  x   /  AST  54<H>  /  ALT  54<H>  /  AlkPhos  61  03-14      Thyroid Stimulating Hormone, Serum: 1.25 uIU/mL (03-14-24 @ 17:15)      EKG NSR. No acute changes   Prior ECG: Yes     RADIOLOGY & ADDITIONAL STUDIES:   Xray Chest 1 View- PORTABLE-Urgent (03.14.24 @ 17:27) >  IMPRESSION: Negative chest.  < end of copied text >      Preliminary evaluation, please await official recommendations     Assessment and recommendations are final when note is signed by the attending.                                      [facility]                                              CARDIOLOGY AT [facility]                                                   [address] [facility], [place] [address]                                             Telephone: [phone]. Fax:[phone]      CARDIOLOGY CONSULTATION NOTE                                                                                             History obtained by: Patient and medical record  Community Cardiologist: Cardiology Dr Craft, EP Dr Wren   obtained: No   Reason for Consultation: Chest pain    Chief complaint:   Patient is a 43y old  Male who presents with a chief complaint of frequent PVCs.    HPI: 43-year-old obese male with PMHx of HTN, HLD, PVCs (on Cardizem), pre-DM, and ?chest mass presented co frequent PVCs associated with chest pain, palpitations, SOB and diaphoresis. Pt is having these symptoms for 4-6 weeks intermittently. Last episode was today around 3:00 PM and lasted 20 minutes. Chest pain/pressure radiates to the back, 6/10. Usually symptoms happen with exertion but can also happen at rest.  Patient follows with general cardiology and electrophysiology with previous Holter monitor, stress test, CTA chest, and echo with unremarkable findings.  Patient states with symptom onset his Apple Watch alerts him of increased heart rate and PVCs. Denies syncope, dizziness, fevers, chills, headache, cough, nausea, vomiting, diarrhea, hematuria, dysuria, dark stools, focal neurologic symptoms.    CARDIAC TESTING  Allscript reviewed. No additional testings  TTE Echo Complete w/o Contrast w/ Doppler (11.28.22 @ 11:45) >   Summary   The left ventricle is normal in size, wall thickness, wall motion and   contractility. Estimated left ventricular ejection fraction is 55 %.   Normal appearing right ventricle structure and function.   Minimal mitral regurgitation.   Minimal tricuspid valve regurgitation.  < end of copied text >    NM Nuclear Stress Pharmacologic Multiple (11.29.22 @ 10:09) >  IMPRESSION: Normal SPECT Myocardial Perfusion Imaging.  < end of copied text >    CT Angio Chest PE Protocol w/ IV Cont (11.27.22 @ 21:13) >  FINDINGS:  LUNGS AND AIRWAYS: Patent central airways.  No consolidation. Punctate   right lower lobe calcified granuloma.  PLEURA: No pleural effusion.  MEDIASTINUM AND JOYCE: No lymphadenopathy.  VESSELS: Within normal limits. No pulmonary embolism.  HEART: Heart size is normal. No pericardial effusion.  CHEST WALL AND LOWER NECK: Within normal limits.  VISUALIZED UPPER ABDOMEN: Hepatic steatosis  BONES: Within normal limits.  IMPRESSION:  No pulmonary embolism.  < end of copied text >    PAST MEDICAL HISTORY  HTN - Hypertension  Chest Mass  Prediabetes  HTN (hypertension)  HLD (hyperlipidemia)    PAST SURGICAL HISTORY  Denies    SOCIAL HISTORY: Denies smoking/alcohol/drugs    FAMILY HISTORY:  FH: HTN (hypertension) (Father)  FHx: hypertension (Sibling)    Family History of Cardiovascular Disease:  No   Coronary Artery Disease in first degree relative: No   Sudden Cardiac Death in First degree relative:  No     HOME MEDICATIONS:   Cardizem 120 mg OD (today pt called PCP and was told to take it BID)  HCTZ 25 mg OD  rosuvastatin 20 mg OD    ALLERGIES:   No Known Drug Allergies  shellfish (Unknown)  eggs (Unknown)    REVIEW OF SYMPTOMS:   CONSTITUTIONAL: No fever, no chills, no weight loss, no weight gain, no fatigue   ENMT:  No vertigo; No sinus or throat pain  NECK: No pain or stiffness  CARDIOVASCULAR: + chest pain (improved 4/10), no SOB (resolved), no syncope/presyncope, no fatigue, no palpitations (resolved), no dizziness, no Orthopnea, no Paroxsymal nocturnal dyspnea  RESPIRATORY:  no cough  : No dysuria, no hematuria   GI: no nausea, no diarrhea, no constipation, no abdominal pain  NEURO: No headache, no slurred speech   MUSCULOSKELETAL: No joint pain or swelling; No muscle, back, or extremity pain  PSYCH: No agitation, no anxiety.    ALL OTHER REVIEW OF SYSTEMS ARE NEGATIVE.    VITAL SIGNS:   T(C): 36.8 (03-14-24 @ 19:13), Max: 36.8 (03-14-24 @ 19:13)  T(F): 98.2 (03-14-24 @ 19:13), Max: 98.2 (03-14-24 @ 19:13)  HR: 73 (03-14-24 @ 19:13) (51 - 73)  BP: 129/83 (03-14-24 @ 19:13) (129/83 - 137/86)  RR: 18 (03-14-24 @ 19:13) (18 - 18)  SpO2: 98% (03-14-24 @ 19:13) (98% - 99%)    PHYSICAL EXAM:   Constitutional: Comfortable . No acute distress.   HEENT: Atraumatic and normocephalic , neck is supple . no JVD.   CNS: A&Ox3. No focal deficits.   Respiratory: CTAB, unlabored. No wheezing, No rhonchi. No crackles   Cardiovascular: RRR normal s1 s2. No murmur. No rubs or gallop.  Gastrointestinal: Soft, non-tender. +Bowel sounds.   Extremities: 2+ Peripheral Pulses, No edema  Psychiatric: Calm . no agitation.   Skin: Warm and dry    LABS:                         13.9   5.20  )-----------( 213      ( 14 Mar 2024 17:15 )             39.9     03-14    137  |  98  |  9.9  ----------------------------<  170<H>  3.8   |  27.0  |  0.97    Ca    9.6      14 Mar 2024 17:15  Mg     1.8     03-14    TPro  6.9  /  Alb  4.3  /  TBili  0.3<L>  /  DBili  x   /  AST  54<H>  /  ALT  54<H>  /  AlkPhos  61  03-14      Thyroid Stimulating Hormone, Serum: 1.25 uIU/mL (03-14-24 @ 17:15)      EKG NSR. No acute changes   Prior ECG: Yes     RADIOLOGY & ADDITIONAL STUDIES:   Xray Chest 1 View- PORTABLE-Urgent (03.14.24 @ 17:27) >  IMPRESSION: Negative chest.  < end of copied text >      Preliminary evaluation, please await official recommendations     Assessment and recommendations are final when note is signed by the attending.                                      [facility]                                              CARDIOLOGY AT [facility]                                                   [address], [place] [address]                                             Telephone: [phone]. Fax:[phone]      CARDIOLOGY CONSULTATION NOTE                                                                                             History obtained by: Patient and medical record  Community Cardiologist: Cardiology Dr Craft ([facility]), EP Dr Wren ([facility])   obtained: No   Reason for Consultation: Chest pain    Chief complaint:   Patient is a 43y old  Male who presents with a chief complaint of frequent PVCs.    HPI: 43-year-old obese male with PMHx of HTN, HLD, PVCs (on Cardizem), pre-DM, and ?chest mass presented co frequent PVCs associated with chest pain, palpitations, SOB and diaphoresis. Pt is having these symptoms for 4-6 weeks intermittently. Last episode was today around 3:00 PM and lasted 20 minutes. Chest pain/pressure radiates to the back, 6/10. Usually symptoms happen with exertion but can also happen at rest.  Patient follows with general cardiology and electrophysiology with previous Holter monitor, stress test, CTA chest, and echo with unremarkable findings.  Patient states with symptom onset his Apple Watch alerts him of increased heart rate and PVCs. Denies syncope, dizziness, fevers, chills, headache, cough, nausea, vomiting, diarrhea, hematuria, dysuria, dark stools, focal neurologic symptoms.    CARDIAC TESTING  Allscript reviewed. No additional testings  TTE Echo Complete w/o Contrast w/ Doppler (11.28.22 @ 11:45) >   Summary   The left ventricle is normal in size, wall thickness, wall motion and   contractility. Estimated left ventricular ejection fraction is 55 %.   Normal appearing right ventricle structure and function.   Minimal mitral regurgitation.   Minimal tricuspid valve regurgitation.  < end of copied text >    NM Nuclear Stress Pharmacologic Multiple (11.29.22 @ 10:09) >  IMPRESSION: Normal SPECT Myocardial Perfusion Imaging.  < end of copied text >    CT Angio Chest PE Protocol w/ IV Cont (11.27.22 @ 21:13) >  FINDINGS:  LUNGS AND AIRWAYS: Patent central airways.  No consolidation. Punctate   right lower lobe calcified granuloma.  PLEURA: No pleural effusion.  MEDIASTINUM AND JOYCE: No lymphadenopathy.  VESSELS: Within normal limits. No pulmonary embolism.  HEART: Heart size is normal. No pericardial effusion.  CHEST WALL AND LOWER NECK: Within normal limits.  VISUALIZED UPPER ABDOMEN: Hepatic steatosis  BONES: Within normal limits.  IMPRESSION:  No pulmonary embolism.  < end of copied text >    PAST MEDICAL HISTORY  HTN - Hypertension  Chest Mass  Prediabetes  HTN (hypertension)  HLD (hyperlipidemia)    PAST SURGICAL HISTORY  Denies    SOCIAL HISTORY: Denies smoking/alcohol/drugs    FAMILY HISTORY:  FH: HTN (hypertension) (Father)  FHx: hypertension (Sibling)    Family History of Cardiovascular Disease:  No   Coronary Artery Disease in first degree relative: No   Sudden Cardiac Death in First degree relative:  No     HOME MEDICATIONS:   Cardizem 120 mg OD (today pt called PCP and was told to take it BID)  HCTZ 25 mg OD  rosuvastatin 20 mg OD    ALLERGIES:   No Known Drug Allergies  shellfish (Unknown)  eggs (Unknown)    REVIEW OF SYMPTOMS:   CONSTITUTIONAL: No fever, no chills, no weight loss, no weight gain, no fatigue   ENMT:  No vertigo; No sinus or throat pain  NECK: No pain or stiffness  CARDIOVASCULAR: + chest pain (improved 4/10), no SOB (resolved), no syncope/presyncope, no fatigue, no palpitations (resolved), no dizziness, no Orthopnea, no Paroxsymal nocturnal dyspnea  RESPIRATORY:  no cough  : No dysuria, no hematuria   GI: no nausea, no diarrhea, no constipation, no abdominal pain  NEURO: No headache, no slurred speech   MUSCULOSKELETAL: No joint pain or swelling; No muscle, back, or extremity pain  PSYCH: No agitation, no anxiety.    ALL OTHER REVIEW OF SYSTEMS ARE NEGATIVE.    VITAL SIGNS:   T(C): 36.8 (03-14-24 @ 19:13), Max: 36.8 (03-14-24 @ 19:13)  T(F): 98.2 (03-14-24 @ 19:13), Max: 98.2 (03-14-24 @ 19:13)  HR: 73 (03-14-24 @ 19:13) (51 - 73)  BP: 129/83 (03-14-24 @ 19:13) (129/83 - 137/86)  RR: 18 (03-14-24 @ 19:13) (18 - 18)  SpO2: 98% (03-14-24 @ 19:13) (98% - 99%)    PHYSICAL EXAM:   Constitutional: Comfortable . No acute distress.   HEENT: Atraumatic and normocephalic , neck is supple . no JVD.   CNS: A&Ox3. No focal deficits.   Respiratory: CTAB, unlabored. No wheezing, No rhonchi. No crackles   Cardiovascular: RRR normal s1 s2. No murmur. No rubs or gallop.  Gastrointestinal: Soft, non-tender. +Bowel sounds.   Extremities: 2+ Peripheral Pulses, No edema  Psychiatric: Calm . no agitation.   Skin: Warm and dry    LABS:                         13.9   5.20  )-----------( 213      ( 14 Mar 2024 17:15 )             39.9     03-14    137  |  98  |  9.9  ----------------------------<  170<H>  3.8   |  27.0  |  0.97    Ca    9.6      14 Mar 2024 17:15  Mg     1.8     03-14    TPro  6.9  /  Alb  4.3  /  TBili  0.3<L>  /  DBili  x   /  AST  54<H>  /  ALT  54<H>  /  AlkPhos  61  03-14      Thyroid Stimulating Hormone, Serum: 1.25 uIU/mL (03-14-24 @ 17:15)      EKG NSR. No acute changes   Prior ECG: Yes     RADIOLOGY & ADDITIONAL STUDIES:   Xray Chest 1 View- PORTABLE-Urgent (03.14.24 @ 17:27) >  IMPRESSION: Negative chest.  < end of copied text >      Preliminary evaluation, please await official recommendations     Assessment and recommendations are final when note is signed by the attending.

## 2024-03-14 NOTE — ED CDU PROVIDER INITIAL DAY NOTE - ATTENDING APP SHARED VISIT CONTRIBUTION OF CARE
43-year-old male history of hypertension, dyslipidemia, PVCs on Cardizem presents with palpitation, chest pain, shortness of breath especially with exertion.  Patient report he feels his PVC more frequent.   EKG is sinus with PVCs..  Has PVC on the monitor.  Troponin 9, repeat 9.  Chest x-ray clear.  Patient has hypomagnesia, repletion given.  Patient placed in observation for telemonitoring and cardiology consult.  Cardiology recommend serial troponin, D-dimer, echo.

## 2024-03-14 NOTE — ED ADULT NURSE NOTE - OBJECTIVE STATEMENT
Patient presented to ED complaining of chest pressure. Patient AXOX4, Vss, RR even and unlabored. Cardaic monitor NSR, Sating % on RA. No acute distress noted.

## 2024-03-14 NOTE — CONSULT NOTE ADULT - PROBLEM SELECTOR RECOMMENDATION 2
Well controlled however pt states having high BP at home. Today SBP was 170. PCR increased Cardizem 120 mg OD to BID today  Continue home meds with strict parameters  Monitor BP  DASH diet

## 2024-03-14 NOTE — ED PROVIDER NOTE - PHYSICAL EXAMINATION
General: Well appearing in no acute distress, alert and cooperative  Head: Normocephalic, atraumatic  Eyes: PERRLA, no conjunctival injection, no scleral icterus, EOMI  ENMT: Atraumatic external nose and ears  Neck: Soft and supple, full ROM without pain  Cardiac: Regular rate and regular rhythm, no murmurs, no LE edema.  Resp: Unlabored respiratory effort, lungs CTAB  Abd: Soft, non-tender, non-distended  MSK: Spine midline and non-tender  Skin: Warm and dry  Neuro: AO x 3, moves all extremities symmetrically, Motor strength and sensation grossly intact

## 2024-03-14 NOTE — ED PROVIDER NOTE - ATTENDING CONTRIBUTION TO CARE
Nonspecific palpitations, intermittent tachycardia recorded on the watch. ECG  nonischemic appearing, troponin within range. Will keep in ED obs for tele monitoring to evaluate for dysrhythmia, further ischemic eval as indicated.

## 2024-03-14 NOTE — ED CDU PROVIDER INITIAL DAY NOTE - PHYSICAL EXAMINATION
Gen: No acute distress, non toxic male, sitting in no acute distress   HEENT: NCAT, Mucous membranes moist, Oropharynx without exudates, uvula midline  Eyes: pink conjunctivae, EOMI, PERRL  CV: RRR, nl s1/s2, no LE edema, chest wall non-tender   Resp: CTAB, normal rate and effort, no respiratory distress   GI: Abdomen soft, NT, ND. No rebound, no guarding  : No CVAT  Neuro: A&O x 3, sensorimotor intact without deficits   MSK: No spine or joint tenderness to palpation, Full ROM ext x 4  Skin: No rashes. intact and perfused  Calm and cooperative

## 2024-03-14 NOTE — CONSULT NOTE ADULT - PROBLEM SELECTOR RECOMMENDATION 9
incomplete 0.0........0.    Telemonitor   mg x 1  Morphine PRN x chest pain   O2 NC PRN  Trend CE. Trend trops x 3. Serial EKGs  Obtain D-dimer, A1C, lipid panel fasting, TFTs, sed rate to ro comorbidities   Obtain Echo to assess structural and functional status  Maintain K+~4 and mag~2  DASH diet  Encourage daily exercise as tolerated and optimal weight management 42 yo obese male with PMHx of HTN, HLD, PVCs (on Cardizem), pre-DM, and ?chest mass presented to ED co frequent PVCs associated with radiated chest pain, palpitations, SOB and diaphoresis. Pt is having these symptoms for 4-6 weeks intermittently. Last episode was today around 3:00 PM and lasted 20 minutes. Usually symptoms happen with exertion but can also happen at rest.  Patient follows with general cardiology and electrophysiology with previous Holter monitor, stress test, CTA chest, and echo with unremarkable findings.  No FHx of CAD. Today he called PCP who increased Cardizem from 120 mg OD to BID.   Chest pain is not TTP, improved since arrival. The rest of symptoms resolved.  Trop x 1 negative  EKG    Telemonitor   mg x 1  Morphine PRN x chest pain   O2 NC PRN  Trend CE. Trend trops x 3. Serial EKGs  Obtain D-dimer, A1C, lipid panel fasting, TFTs, sed rate to ro comorbidities   Obtain Echo to assess structural and functional status  Maintain K+~4 and mag~2  DASH diet  Encourage daily exercise as tolerated and optimal weight management 44 yo obese male with PMHx of HTN, HLD, PVCs (on Cardizem), pre-DM, and ?chest mass presented to ED co frequent PVCs associated with radiated chest pain, palpitations, SOB and diaphoresis. Pt is having these symptoms for 4-6 weeks intermittently. Last episode was today around 3:00 PM and lasted 20 minutes. Usually symptoms happen with exertion but can also happen at rest.  Patient follows with general cardiology and electrophysiology with previous Holter monitor, stress test, CTA chest, and echo with unremarkable findings.  No FHx of CAD. Today he called PCP who increased Cardizem from 120 mg OD to BID.   Chest pain is not TTP, improved since arrival. The rest of symptoms resolved.  Trop x 2 negative (9-9)  D-dimer minimally elevated (248) r/o PE  EKG    Telemonitor   mg x 1  Morphine PRN x chest pain   O2 NC PRN  Trend CE. Trend trops x 3. Serial EKGs  Obtain D-dimer, A1C, lipid panel fasting, TFTs, sed rate to ro comorbidities   Obtain Echo to assess structural and functional status  Maintain K+~4 and mag~2  DASH diet  Encourage daily exercise as tolerated and optimal weight management 44 yo obese male with PMHx of HTN, HLD, PVCs (on Cardizem), pre-DM, and ?chest mass presented to ED co frequent PVCs associated with radiated chest pain, palpitations, SOB and diaphoresis. Pt is having these symptoms for 4-6 weeks intermittently. Last episode was today around 3:00 PM and lasted 20 minutes. Usually symptoms happen with exertion but can also happen at rest.  Patient follows with general cardiology and electrophysiology with previous Holter monitor, stress test, CTA chest, and echo with unremarkable findings.  No FHx of CAD. Today he called PCP who increased Cardizem from 120 mg OD to BID.   Chest pain is not TTP, improved since arrival. The rest of symptoms resolved.  Trop x 2 negative (9-9)  D-dimer minimally elevated (248) r/o PE  EKG  TTE 11/2022 EF 55% and unremarkable  Stress test normal  CTA chest PE prot 11/22 unremarkable   Pt may need eventually cardiac cath, since he is having these symptoms intermittently x 2 years and all testings are unremarkable   Telemonitor   mg x 1  Morphine PRN x chest pain   O2 NC PRN  Trend CE. Trend trops x 3. Serial EKGs  Obtain D-dimer, A1C, lipid panel fasting, TFTs, sed rate to ro comorbidities   Obtain Echo to assess structural and functional status  Maintain K+~4 and mag~2  DASH diet  Encourage daily exercise as tolerated and optimal weight management 42 yo obese male with PMHx of HTN, HLD, PVCs (on Cardizem), pre-DM, and ?chest mass presented to ED co frequent PVCs associated with radiated chest pain, palpitations, SOB and diaphoresis. Pt is having these symptoms for 4-6 weeks intermittently. Last episode was today around 3:00 PM and lasted 20 minutes. Usually symptoms happen with exertion but can also happen at rest.  Patient follows with general cardiology and electrophysiology with previous Holter monitor, stress test, CTA chest, and echo with unremarkable findings.  No FHx of CAD. Today he called PCP who increased Cardizem from 120 mg OD to BID.   Chest pain is not TTP, improved since arrival. The rest of symptoms resolved.  Trop x 2 negative (9-9)  D-dimer minimally elevated (248) r/o PE  EKG NSR. No acute changes   TTE 11/2022 EF 55% and unremarkable  Stress test normal  CTA chest PE prot 11/22 unremarkable   Pt may need eventually cardiac cath, since he is having these symptoms intermittently x 2 years and all testings are unremarkable   Telemonitor   mg x 1  Morphine PRN x chest pain   O2 NC PRN  Trend CE. Trend trops x 3. Serial EKGs  Obtain D-dimer, A1C, lipid panel fasting, TFTs, sed rate to ro comorbidities   Obtain Echo to assess structural and functional status  Maintain K+~4 and mag~2  DASH diet  Encourage daily exercise as tolerated and optimal weight management

## 2024-03-14 NOTE — CONSULT NOTE ADULT - NS ATTEND AMEND GEN_ALL_CORE FT
seen with above,    43M h/o obesity, HTN, HLD, symptomatic PVCs following with outside cardiologist and EP reportedly had prior extensive workup including cardiac MRI, last Holter done last year on low dose diltiazem 120mg felt worsening symptomatic PVCs yesterday Apple watch tracing with ventricular bigeminy called his EP and advised to increase diltiazem dose but he was worried and prompted ER visit, lab work unremarkable, CTPA done negative, overnight telemetry no significant PVCs burden   -Echo prelim repeat still with normal LV systolic function/EF >60%, RV size is normal  -can discharge home with Diltiazem 240mg CD, outpatient follow up with his EP for Holter monitor burden and to consider elective PVC ablation        Dimitry Ennis DO, [facility]  Faculty Non-Invasive Cardiologist  [phone]

## 2024-03-14 NOTE — CONSULT NOTE ADULT - CONSULT REQUESTED DATE/TIME
[facility]  PRE OP RISK ASSESSMENT    REASON FOR CONSULT: Pre-op risk assessment for surgical procedure vitrectomy planned for 1/31/19 with Alonzo Ace.     REQUESTING PHYSICIAN: Dr Marinelli La: Patient presents with:  Pre-Op Exam: Pr Cataract surgery, LEFT   • COLONOSCOPY  1/1/2005    negative per patient   • COLONOSCOPY N/A 12/8/2014    Performed by Madonna Martinez MD at [facility] ENDOSCOPY   • HYSTERECTOMY  1/1/1988    WITH SINGLE OOPHERECTOMY   • SHERRY BIOPSY STEREOTACTIC NODULE 2 SITE B on file      Food insecurity - worry: Not on file      Food insecurity - inability: Not on file      Transportation needs - medical: Not on file      Transportation needs - non-medical: Not on file    Occupational History      Not on file    Tobacco Use 97.8 °F (36.6 °C) (Oral)   Resp 16   Ht 65\"   Wt 185 lb   BMI 30.79 kg/m²    GENERAL: well developed, well nourished,in no apparent distress  SKIN: no rashes,no suspicious lesions  CHEST: no chest tenderness  LUNGS: clear to auscultation  CARDIO: RRR with on file. There are no Patient Instructions on file for this visit. 14-Mar-2024

## 2024-03-14 NOTE — ED CDU PROVIDER INITIAL DAY NOTE - NS ED ROS FT
Gen: denies fever, chills, fatigue  HEENT: denies ear pain, nasal congestion, throat pain  Respiratory: denies CASTELLANOS, cough  Cardiovascular: (+) chest pain, (+) palpitations, diaphoresis, LE edema  GI: denies abdominal pain, n/v  : denies dysuria, frequency, urgency  MSK: denies joint swelling/pain, back pain, neck pain  Neuro: denies headache, dizziness, weakness, numbness

## 2024-03-14 NOTE — ED ADULT TRIAGE NOTE - CHIEF COMPLAINT QUOTE
BIBEMS for palpitations and chest pressure. PMHx HTN, PVCs on diltiazem and HCTZ. States that this has happened to him before but the pressure got worse today with associated SOB. EKG in progress.

## 2024-03-15 ENCOUNTER — RESULT REVIEW (OUTPATIENT)
Age: 44
End: 2024-03-15

## 2024-03-15 VITALS
HEART RATE: 71 BPM | RESPIRATION RATE: 18 BRPM | SYSTOLIC BLOOD PRESSURE: 125 MMHG | OXYGEN SATURATION: 98 % | TEMPERATURE: 98 F | DIASTOLIC BLOOD PRESSURE: 70 MMHG

## 2024-03-15 LAB
CHOLEST SERPL-MCNC: 154 MG/DL — SIGNIFICANT CHANGE UP
HDLC SERPL-MCNC: 46 MG/DL — SIGNIFICANT CHANGE UP
LIPID PNL WITH DIRECT LDL SERPL: 82 MG/DL — SIGNIFICANT CHANGE UP
NON HDL CHOLESTEROL: 108 MG/DL — SIGNIFICANT CHANGE UP
TRIGL SERPL-MCNC: 131 MG/DL — SIGNIFICANT CHANGE UP

## 2024-03-15 PROCEDURE — 36415 COLL VENOUS BLD VENIPUNCTURE: CPT

## 2024-03-15 PROCEDURE — 93005 ELECTROCARDIOGRAM TRACING: CPT

## 2024-03-15 PROCEDURE — 96374 THER/PROPH/DIAG INJ IV PUSH: CPT

## 2024-03-15 PROCEDURE — 99285 EMERGENCY DEPT VISIT HI MDM: CPT

## 2024-03-15 PROCEDURE — 71045 X-RAY EXAM CHEST 1 VIEW: CPT

## 2024-03-15 PROCEDURE — 84484 ASSAY OF TROPONIN QUANT: CPT

## 2024-03-15 PROCEDURE — 84443 ASSAY THYROID STIM HORMONE: CPT

## 2024-03-15 PROCEDURE — 93306 TTE W/DOPPLER COMPLETE: CPT

## 2024-03-15 PROCEDURE — 71275 CT ANGIOGRAPHY CHEST: CPT | Mod: 26,MC

## 2024-03-15 PROCEDURE — 83880 ASSAY OF NATRIURETIC PEPTIDE: CPT

## 2024-03-15 PROCEDURE — G0378: CPT

## 2024-03-15 PROCEDURE — 80053 COMPREHEN METABOLIC PANEL: CPT

## 2024-03-15 PROCEDURE — 80061 LIPID PANEL: CPT

## 2024-03-15 PROCEDURE — 71275 CT ANGIOGRAPHY CHEST: CPT | Mod: MC

## 2024-03-15 PROCEDURE — 93306 TTE W/DOPPLER COMPLETE: CPT | Mod: 26

## 2024-03-15 PROCEDURE — 83735 ASSAY OF MAGNESIUM: CPT

## 2024-03-15 PROCEDURE — 99285 EMERGENCY DEPT VISIT HI MDM: CPT | Mod: 25

## 2024-03-15 PROCEDURE — 85025 COMPLETE CBC W/AUTO DIFF WBC: CPT

## 2024-03-15 PROCEDURE — 85379 FIBRIN DEGRADATION QUANT: CPT

## 2024-03-15 PROCEDURE — 99239 HOSP IP/OBS DSCHRG MGMT >30: CPT

## 2024-03-15 RX ADMIN — Medication 240 MILLIGRAM(S): at 05:31

## 2024-03-15 NOTE — ED CDU PROVIDER DISPOSITION NOTE - PATIENT PORTAL LINK FT
You can access the FollowMyHealth Patient Portal offered by [facility] by registering at the following website: [email or web address]. By joining [employer]’s FollowMyHealth portal, you will also be able to view your health information using other applications (apps) compatible with our system.

## 2024-03-15 NOTE — ED CDU PROVIDER SUBSEQUENT DAY NOTE - PHYSICAL EXAMINATION
Gen: No acute distress, non toxic  HENT: NCAT, Mucous membranes moist, Oropharynx without exudates, uvula midline  Eyes: pink conjunctivae, EOMI, PERRL  CV: RRR, nl s1/s2.  Resp: CTAB, normal rate and effort  GI: Abdomen soft, NT, ND. No rebound, no guarding  : No CVAT  Neuro: A&O x 3, sensorimotor intact without deficits   MSK: No spine or joint tenderness to palpation, Full ROM ext x 4  Skin: No rashes. intact and perfused

## 2024-03-15 NOTE — ED ADULT NURSE REASSESSMENT NOTE - NS ED NURSE REASSESS COMMENT FT1
Report received at 7:15am by off going nurse. Pt care assumed at this time. Pt A+Ox4, respirations are equal and unlabored on room air. Pt denies SOB at this time. Pt c/o chest tightness, states that the severity of it has decreased a little since his arrival to the hospital. Pt denies N/V/D, ABD pain, or dizziness at this time. Pt connected to  and cardiac monitor. Pt left in position of comfort, bed of stretcher locked and in the lowest position.

## 2024-03-15 NOTE — ED CDU PROVIDER SUBSEQUENT DAY NOTE - ATTENDING APP SHARED VISIT CONTRIBUTION OF CARE
I, Julio C Barba, performed the initial face to face bedside interview with this patient regarding history of present illness, review of symptoms and relevant past medical, social and family history.  I completed an independent physical examination.  I was the initial provider who evaluated this patient. I have signed out the follow up of any pending tests (i.e. labs, radiological studies) to the ACP.  I have communicated the patient’s plan of care and disposition with the ACP.

## 2024-03-15 NOTE — ED CDU PROVIDER DISPOSITION NOTE - CLINICAL COURSE
43M with omh HTN, HLD, symptomatic PVCs presenting with intermittent palpitations, sob, cp. Pt placed in obs for cardiology consult and TTE. Trop neg, CTA without PE, TTE with EF>60%. Cleared by cardiology for for discharge with increased dose of diltiazem. Pt has EP follow up next week. discussed return precautions.

## 2024-03-15 NOTE — ED CDU PROVIDER SUBSEQUENT DAY NOTE - NS ED ROS FT
Gen: denies fever, chills, fatigue, weight loss  Skin: denies rashes  HEENT: denies visual changes, ear pain, nasal congestion  Respiratory: denies CASTELLANOS, (+) SOB, no cough, no wheezing  Cardiovascular: (+) chest pain, no palpitations  GI: denies abdominal pain, n/v  : denies dysuria, frequency, urgency   MSK: denies joint swelling/pain, back pain, neck pain  Neuro: denies headache, dizziness, weakness, numbness

## 2024-03-15 NOTE — ED ADULT NURSE REASSESSMENT NOTE - NS ED NURSE REASSESS COMMENT FT1
Pt A+Ox4. Respirations are equal and unlabored. Pt speaking full complete sentences. Pt denies ABD pain, dizziness, or SOB. Pt states he is still experiencing chest pressure, but it has been decreasing. Pt provided with discharge instructions, IV removed.

## 2024-03-15 NOTE — ED CDU PROVIDER SUBSEQUENT DAY NOTE - HISTORY
Patient resting comfortably overnight. Cardiology consulted. On monitor. Dimer elevated, CTA ordered.

## 2024-03-15 NOTE — ED ADULT NURSE REASSESSMENT NOTE - NS ED NURSE REASSESS COMMENT FT1
Pt A+Ox4. Respirations are equal and unlabored. Pt speaking full complete sentences. Pt denies SOB, states he is still experiencing chest pressure. Pt connected to  and cardiac monitor. Pt left in position of comfort, wheels of stretcher locked and in the lowest position.

## 2024-04-09 NOTE — ED ADULT TRIAGE NOTE - STATUS:
Atrovent nasal spray 2 sprays per nostril 2x/day and flovent 1 spray per nostril 1x/day.  Tessalon 200 mg by mouth 3x/day.  Phenergan DM 5mL every 6 hours as needed for breakthrough cough.    
Applied

## 2025-06-30 ENCOUNTER — OFFICE (OUTPATIENT)
Dept: URBAN - METROPOLITAN AREA CLINIC 6 | Facility: CLINIC | Age: 45
Setting detail: OPHTHALMOLOGY
End: 2025-06-30
Payer: COMMERCIAL

## 2025-06-30 DIAGNOSIS — H00.022: ICD-10-CM

## 2025-06-30 PROCEDURE — 99213 OFFICE O/P EST LOW 20 MIN: CPT

## 2025-06-30 ASSESSMENT — CONFRONTATIONAL VISUAL FIELD TEST (CVF)
OS_FINDINGS: FULL
OD_FINDINGS: FULL

## 2025-06-30 ASSESSMENT — VISUAL ACUITY
OD_BCVA: 20/20
OS_BCVA: 20/20

## 2025-07-10 ENCOUNTER — OFFICE (OUTPATIENT)
Dept: URBAN - METROPOLITAN AREA CLINIC 6 | Facility: CLINIC | Age: 45
Setting detail: OPHTHALMOLOGY
End: 2025-07-10
Payer: COMMERCIAL

## 2025-07-10 DIAGNOSIS — H40.013: ICD-10-CM

## 2025-07-10 DIAGNOSIS — H00.022: ICD-10-CM

## 2025-07-10 PROBLEM — H52.7 REFRACTIVE ERROR: Status: ACTIVE | Noted: 2025-07-10

## 2025-07-10 PROCEDURE — 92014 COMPRE OPH EXAM EST PT 1/>: CPT

## 2025-07-10 PROCEDURE — 92133 CPTRZD OPH DX IMG PST SGM ON: CPT

## 2025-07-10 ASSESSMENT — REFRACTION_AUTOREFRACTION
OS_CYLINDER: -1.25
OD_CYLINDER: -0.50
OD_AXIS: 097
OS_SPHERE: +0.25
OD_SPHERE: -0.75
OS_AXIS: 095

## 2025-07-10 ASSESSMENT — CONFRONTATIONAL VISUAL FIELD TEST (CVF)
OS_FINDINGS: FULL
OD_FINDINGS: FULL

## 2025-07-10 ASSESSMENT — KERATOMETRY
OS_AXISANGLE_DEGREES: 042
OS_K2POWER_DIOPTERS: 42.50
OD_AXISANGLE_DEGREES: 094
OS_K1POWER_DIOPTERS: 41.75
OD_K1POWER_DIOPTERS: 42.50
OD_K2POWER_DIOPTERS: 42.75

## 2025-07-10 ASSESSMENT — REFRACTION_CURRENTRX
OD_VPRISM_DIRECTION: SV
OS_SPHERE: +0.50
OD_AXIS: 085
OD_SPHERE: +0.50
OS_OVR_VA: 20/
OS_CYLINDER: -0.75
OD_OVR_VA: 20/
OS_AXIS: 088
OD_CYLINDER: -1.00
OS_VPRISM_DIRECTION: SV

## 2025-07-10 ASSESSMENT — TONOMETRY: OD_IOP_MMHG: 20

## 2025-07-10 ASSESSMENT — VISUAL ACUITY
OS_BCVA: 20/20
OD_BCVA: 20/20-1